# Patient Record
Sex: FEMALE | HISPANIC OR LATINO | Employment: UNEMPLOYED | URBAN - METROPOLITAN AREA
[De-identification: names, ages, dates, MRNs, and addresses within clinical notes are randomized per-mention and may not be internally consistent; named-entity substitution may affect disease eponyms.]

---

## 2018-11-20 NOTE — PROCEDURES
Procedures      Bipolar Radiofrequency Ablation of Sacroiliac Joint Nerves.  Surgeon: Gail Hernandez M.D.,  ASSISTANT:  None    PREOPERATIVE DIAGNOSIS:  Lumbosacral spondylosis with Sacroiliitis.    POSTOPERATIVE DIAGNOSIS:  Lumbosacral spondylosis with Sacroiliitis.    PROCEDURE PERFORMED:  1. Bipolar Radiofrequency ablation of the L5 dorsal rami branch nerve on the right side.  2. Additional levels, lateral branches of the S1, S2 and S3 on the right side.  3. Fluoroscopy for precise needle placement.    ANESTHESIA:  Local infiltration with monitored anesthesia care conscious sedation.    MONITORS:   Automatic blood pressure cuff and pulse oximetry.    INDICATIONS:  2 consecutive successful blocks with 80% improvement of pain after the blocks.  REVIEW OF SYSTEMS:  Negative for fever, chills, chest pain, SOB, bleeding abnormalities, nausea, vomiting, diarrhea, worsening edema, or new rashes.    FOCUSED PHYSICAL EXAMINATION:  The patient is awake, alert and oriented, and is in no acute distress.  Vital signs are stable.  The patient is afebrile.  The rest of the PE is essentially unchanged from the patient's recent visit to our office.    We explained the procedure to the patient including the risks, benefits and alternatives to the procedure.  The patient verbalized understanding and was willing to proceed.    PROCEDURE IN DETAIL:  An informed consent was obtained.  The patient was taken to the procedure room and was positively identified by the staff and the attending physician.  The patient was positioned prone on the procedure bed.  Vital signs were monitored as above and remained stable throughout the procedure.  The skin was prepped and draped in the standard sterile fashion.  A surgical pause (time-out) was performed and was agreed upon by the members of the team. A fluoroscopic view of the lumbar spine was obtained, and the area of interest was identified.  The skin was anesthetized using 1% lidocaine and  25-gauge 1-1/2-inch needle.    After that, two 16-gauge 10-cm cooled RF cannulas with 10-mm active tip was advanced on to juncture of S1 SAP and Sacral ala on the right. Full contact with the bone was established.  Additional RF cannulae were placed 2 needes at each level lateral to right S1, S2, S3 foramen. (Each level was stimulated for sensory fibers at 50 Hz, and the patient's pain was reproduced at approximately 1.2 V.)  (The motor fiber recruitment was ruled out by stimulation at 2 Hz in the range between 1.5 to 2.5 V.)  Prior to lesioning, each nerve was anesthetized with 0.5 mL of Lidocane 1%. For L5 dorsal rami one lesion was performed and after that at each level, we performed three lesions at 90 degrees centigrade, 105 seconds in duration at 5:30, 4:00 and 2:30 o'clock.   Before removing the radiofrequency cannulae, each site was injected with 1 ml of the mixture containing 40 mg Kenalog and 4 cc of bupivacaine 0.25%.    All needles were withdrawn.  The patient tolerated the procedure well.  The patient was transferred in stable condition to the recovery room.    COMPLICATIONS:  None.    PAIN SCORE BEFORE THE PROCEDURE:     PAIN SCORE AFTER THE PROCEDURE:      DISPOSITON:  1. Discharge to home when the discharge criteria are met.  2. Follow up in two weeks in the Pain Clinic.  3. (The patient will resume their medications.

## 2018-11-21 ENCOUNTER — HOSPITAL ENCOUNTER (OUTPATIENT)
Dept: RADIOLOGY | Facility: REHABILITATION | Age: 63
End: 2018-11-21
Attending: PAIN MEDICINE

## 2018-11-21 ENCOUNTER — HOSPITAL ENCOUNTER (OUTPATIENT)
Dept: PAIN MANAGEMENT | Facility: REHABILITATION | Age: 63
End: 2018-11-21
Attending: PAIN MEDICINE
Payer: MEDICARE

## 2018-11-21 VITALS
HEART RATE: 88 BPM | TEMPERATURE: 97.2 F | BODY MASS INDEX: 50.47 KG/M2 | HEIGHT: 60 IN | WEIGHT: 257.06 LBS | DIASTOLIC BLOOD PRESSURE: 68 MMHG | OXYGEN SATURATION: 95 % | RESPIRATION RATE: 16 BRPM | SYSTOLIC BLOOD PRESSURE: 128 MMHG

## 2018-11-21 PROCEDURE — 99152 MOD SED SAME PHYS/QHP 5/>YRS: CPT

## 2018-11-21 PROCEDURE — 64640 INJECTION TREATMENT OF NERVE: CPT

## 2018-11-21 PROCEDURE — 64635 DESTROY LUMB/SAC FACET JNT: CPT

## 2018-11-21 PROCEDURE — 700111 HCHG RX REV CODE 636 W/ 250 OVERRIDE (IP)

## 2018-11-21 RX ORDER — TRIAMCINOLONE ACETONIDE 40 MG/ML
INJECTION, SUSPENSION INTRA-ARTICULAR; INTRAMUSCULAR
Status: COMPLETED
Start: 2018-11-21 | End: 2018-11-21

## 2018-11-21 RX ORDER — LORAZEPAM 0.5 MG/1
TABLET ORAL
Refills: 3 | COMMUNITY
Start: 2018-11-09 | End: 2019-10-16

## 2018-11-21 RX ORDER — ERGOCALCIFEROL 1.25 MG/1
CAPSULE ORAL
Refills: 1 | COMMUNITY
Start: 2018-10-02

## 2018-11-21 RX ORDER — LIOTHYRONINE SODIUM 5 UG/1
10 TABLET ORAL DAILY
COMMUNITY
Start: 2018-11-13

## 2018-11-21 RX ORDER — CLINDAMYCIN HYDROCHLORIDE 300 MG/1
CAPSULE ORAL
Refills: 0 | COMMUNITY
Start: 2018-08-31 | End: 2019-09-18

## 2018-11-21 RX ORDER — ALLOPURINOL 100 MG/1
100 TABLET ORAL
Refills: 11 | COMMUNITY
Start: 2018-11-04

## 2018-11-21 RX ORDER — GABAPENTIN 600 MG/1
TABLET ORAL 2 TIMES DAILY
COMMUNITY
Start: 2018-11-12 | End: 2019-10-16

## 2018-11-21 RX ORDER — LIDOCAINE HYDROCHLORIDE 10 MG/ML
INJECTION, SOLUTION EPIDURAL; INFILTRATION; INTRACAUDAL; PERINEURAL
Status: COMPLETED
Start: 2018-11-21 | End: 2018-11-21

## 2018-11-21 RX ORDER — CARVEDILOL 12.5 MG/1
12.5 TABLET ORAL
Refills: 5 | COMMUNITY
Start: 2018-11-01 | End: 2019-10-16

## 2018-11-21 RX ORDER — MORPHINE SULFATE 15 MG/1
TABLET ORAL
Refills: 0 | COMMUNITY
Start: 2018-11-09 | End: 2020-06-22

## 2018-11-21 RX ORDER — BUPIVACAINE HYDROCHLORIDE 2.5 MG/ML
INJECTION, SOLUTION EPIDURAL; INFILTRATION; INTRACAUDAL
Status: COMPLETED
Start: 2018-11-21 | End: 2018-11-21

## 2018-11-21 RX ORDER — IRBESARTAN 300 MG/1
TABLET ORAL
Refills: 1 | COMMUNITY
Start: 2018-10-20 | End: 2019-10-16

## 2018-11-21 RX ORDER — CYPROHEPTADINE HYDROCHLORIDE 4 MG/1
TABLET ORAL
Refills: 11 | COMMUNITY
Start: 2018-11-01

## 2018-11-21 RX ORDER — NALOXONE HYDROCHLORIDE 4 MG/.1ML
SPRAY NASAL
Refills: 0 | COMMUNITY
Start: 2018-10-10 | End: 2019-10-16

## 2018-11-21 RX ORDER — METOLAZONE 5 MG/1
5 TABLET ORAL
Refills: 11 | COMMUNITY
Start: 2018-09-06 | End: 2019-10-16

## 2018-11-21 RX ORDER — DONEPEZIL HYDROCHLORIDE 10 MG/1
10 TABLET, FILM COATED ORAL EVERY EVENING
Refills: 4 | COMMUNITY
Start: 2018-11-08

## 2018-11-21 RX ORDER — DULOXETIN HYDROCHLORIDE 30 MG/1
30 CAPSULE, DELAYED RELEASE ORAL
Refills: 4 | COMMUNITY
Start: 2018-11-08 | End: 2020-06-22

## 2018-11-21 RX ORDER — CYCLOSPORINE 0.5 MG/ML
EMULSION OPHTHALMIC
COMMUNITY
Start: 2018-11-12 | End: 2020-06-22

## 2018-11-21 RX ORDER — MIDAZOLAM HYDROCHLORIDE 1 MG/ML
INJECTION INTRAMUSCULAR; INTRAVENOUS
Status: COMPLETED
Start: 2018-11-21 | End: 2018-11-21

## 2018-11-21 RX ORDER — DIAPER,BRIEF,ADULT, DISPOSABLE
EACH MISCELLANEOUS
Refills: 12 | COMMUNITY
Start: 2018-09-11

## 2018-11-21 RX ORDER — DIAZEPAM 2 MG/1
2 TABLET ORAL EVERY 6 HOURS PRN
COMMUNITY
End: 2018-11-21 | Stop reason: CLARIF

## 2018-11-21 RX ADMIN — TRIAMCINOLONE ACETONIDE 40 MG: 40 INJECTION, SUSPENSION INTRA-ARTICULAR; INTRAMUSCULAR at 16:10

## 2018-11-21 RX ADMIN — LIDOCAINE HYDROCHLORIDE 30 ML: 10 INJECTION, SOLUTION EPIDURAL; INFILTRATION; INTRACAUDAL; PERINEURAL at 15:56

## 2018-11-21 RX ADMIN — MIDAZOLAM HYDROCHLORIDE 2 MG: 1 INJECTION, SOLUTION INTRAMUSCULAR; INTRAVENOUS at 15:54

## 2018-11-21 RX ADMIN — FENTANYL CITRATE 50 MCG: 50 INJECTION, SOLUTION INTRAMUSCULAR; INTRAVENOUS at 16:02

## 2018-11-21 RX ADMIN — FENTANYL CITRATE 50 MCG: 50 INJECTION, SOLUTION INTRAMUSCULAR; INTRAVENOUS at 15:58

## 2018-11-21 RX ADMIN — BUPIVACAINE HYDROCHLORIDE 4 ML: 2.5 INJECTION, SOLUTION EPIDURAL; INFILTRATION; INTRACAUDAL; PERINEURAL at 16:10

## 2018-11-21 NOTE — NON-PROVIDER
"Reviewed Plan of Care with patient and spouse.Spouse present to act as  per patients preference. Patient in wheel chair but able to bear weight. Normally uses walker but using wheel chair for convenience .Confirmed that she does not take any blood thinning medications. Spouse and patient confirm that patient has spinal cord pain stimulator that is currently \"on\". Dr Hernandez aware and will turn off using magnet. Informed patient and spouse to turn stimulator back on once home. Questioning why both sides are not being done; Dr Hernandez in at chairside to explain POC and why only one side today. All (spouse and patient)in agreement . Reviewed home care instructions with patient prior to procedure. All questions and concerns addressed.     "

## 2019-09-18 ENCOUNTER — HOSPITAL ENCOUNTER (OUTPATIENT)
Dept: RADIOLOGY | Facility: REHABILITATION | Age: 64
End: 2019-09-18
Attending: PAIN MEDICINE

## 2019-09-18 ENCOUNTER — HOSPITAL ENCOUNTER (OUTPATIENT)
Dept: PAIN MANAGEMENT | Facility: REHABILITATION | Age: 64
End: 2019-09-18
Attending: PAIN MEDICINE
Payer: MEDICARE

## 2019-09-18 VITALS
TEMPERATURE: 96.8 F | HEART RATE: 98 BPM | RESPIRATION RATE: 13 BRPM | BODY MASS INDEX: 49.53 KG/M2 | DIASTOLIC BLOOD PRESSURE: 74 MMHG | OXYGEN SATURATION: 95 % | SYSTOLIC BLOOD PRESSURE: 115 MMHG | WEIGHT: 262.35 LBS | HEIGHT: 61 IN

## 2019-09-18 PROCEDURE — 62323 NJX INTERLAMINAR LMBR/SAC: CPT

## 2019-09-18 PROCEDURE — 700111 HCHG RX REV CODE 636 W/ 250 OVERRIDE (IP)

## 2019-09-18 PROCEDURE — 700117 HCHG RX CONTRAST REV CODE 255

## 2019-09-18 RX ORDER — OXYCODONE HYDROCHLORIDE 20 MG/1
TABLET ORAL 4 TIMES DAILY
COMMUNITY
End: 2020-06-22

## 2019-09-18 RX ADMIN — IOHEXOL 1 ML: 240 INJECTION, SOLUTION INTRATHECAL; INTRAVASCULAR; INTRAVENOUS; ORAL at 14:58

## 2019-09-18 RX ADMIN — FENTANYL CITRATE 50 MCG: 50 INJECTION, SOLUTION INTRAMUSCULAR; INTRAVENOUS at 15:03

## 2019-09-18 NOTE — NON-PROVIDER
Patient identified, procedure confirmed, medication allergies, pertinent medical history ( sleep apnea, CHF ), significant patient information ( has spinal stimulator, had Morphine 15 mg. At 1400 , Oxycodone at 1200 Noon,Ativan at 070 AM ).  Site marked by Dr. Hernandez  . Positioned patient by CST, RN, X - ray Tech. Both lower legs & feet pillow placed for support. Hands supported on stool under head of the bed.

## 2019-09-18 NOTE — NON-PROVIDER
Current meds. See medication reconciliation form. Reviewed with pt. Pt denies taking ASA,other blood thinners or anti-inflammatories. Pt has a ride post-procedure (, [place] is ). Printed and verbal discharge instructions given to pt who verbalized understanding.Dr. Hernandez made aware pre-procedure that pt took morphine 15 mg at 1400 today, oxycodone 10 mg at 12 noon today and ativan at 0700 today.

## 2019-09-18 NOTE — NON-PROVIDER
Pt tolerated food and fluid post procedure without nausea or vomiting. Transferred to  and into vehicle with 1 assist. Discharged into care of responsible adult.

## 2019-10-16 ENCOUNTER — APPOINTMENT (OUTPATIENT)
Dept: ADMISSIONS | Facility: MEDICAL CENTER | Age: 64
End: 2019-10-16
Attending: PAIN MEDICINE
Payer: MEDICARE

## 2019-10-16 RX ORDER — PANTOPRAZOLE SODIUM 40 MG/1
40 TABLET, DELAYED RELEASE ORAL
COMMUNITY
Start: 2019-01-27

## 2019-10-16 RX ORDER — CARVEDILOL 12.5 MG/1
12.5 TABLET ORAL
COMMUNITY
Start: 2018-12-06 | End: 2019-10-16

## 2019-10-16 RX ORDER — DULOXETIN HYDROCHLORIDE 30 MG/1
30 CAPSULE, DELAYED RELEASE ORAL
COMMUNITY
Start: 2019-01-04 | End: 2019-10-16

## 2019-10-16 RX ORDER — LORAZEPAM 1 MG/1
1 TABLET ORAL 2 TIMES DAILY
COMMUNITY
End: 2020-06-22

## 2019-10-16 RX ORDER — CYPROHEPTADINE HYDROCHLORIDE 4 MG/1
TABLET ORAL
COMMUNITY
Start: 2018-11-01 | End: 2019-10-16

## 2019-10-16 RX ORDER — ERGOCALCIFEROL 1.25 MG/1
CAPSULE ORAL
COMMUNITY
Start: 2018-12-30 | End: 2019-10-16

## 2019-10-16 RX ORDER — OLMESARTAN MEDOXOMIL 20 MG/1
20 TABLET ORAL DAILY
COMMUNITY

## 2019-10-16 RX ORDER — GABAPENTIN 600 MG/1
600 TABLET ORAL 2 TIMES DAILY
COMMUNITY
Start: 2018-11-12 | End: 2020-06-22

## 2019-10-16 RX ORDER — CLONIDINE HYDROCHLORIDE 0.1 MG/1
0.1 TABLET ORAL 2 TIMES DAILY PRN
COMMUNITY

## 2019-10-16 RX ORDER — CARVEDILOL 6.25 MG/1
6.25 TABLET ORAL 2 TIMES DAILY WITH MEALS
COMMUNITY
End: 2020-06-22

## 2019-10-16 RX ORDER — CYCLOSPORINE 0.5 MG/ML
1 EMULSION OPHTHALMIC 2 TIMES DAILY PRN
COMMUNITY
Start: 2018-11-12

## 2019-10-16 RX ORDER — PNV NO.95/FERROUS FUM/FOLIC AC 28MG-0.8MG
1 TABLET ORAL 2 TIMES DAILY
COMMUNITY

## 2019-10-16 RX ORDER — MORPHINE SULFATE 15 MG/1
TABLET ORAL
COMMUNITY
Start: 2019-01-07 | End: 2019-10-16

## 2019-10-16 RX ORDER — DIAPER,BRIEF,ADULT, DISPOSABLE
EACH MISCELLANEOUS
COMMUNITY
Start: 2018-09-11 | End: 2019-10-16

## 2019-10-16 RX ORDER — ALLOPURINOL 100 MG/1
100 TABLET ORAL
COMMUNITY
Start: 2019-01-19 | End: 2019-10-16

## 2019-10-16 RX ORDER — DONEPEZIL HYDROCHLORIDE 10 MG/1
10 TABLET, FILM COATED ORAL
COMMUNITY
Start: 2018-11-08 | End: 2019-10-16

## 2019-10-16 RX ORDER — METHOCARBAMOL 500 MG/1
500 TABLET, FILM COATED ORAL 3 TIMES DAILY
Refills: 2 | COMMUNITY
Start: 2019-08-26

## 2019-10-16 RX ORDER — BUMETANIDE 1 MG/1
1 TABLET ORAL 2 TIMES DAILY
COMMUNITY
Start: 2019-01-19

## 2019-10-16 RX ORDER — OXYCODONE HYDROCHLORIDE 20 MG/1
TABLET ORAL
COMMUNITY
Start: 2019-01-20 | End: 2019-10-16

## 2019-10-16 RX ORDER — NALOXONE HYDROCHLORIDE 4 MG/.1ML
1 SPRAY NASAL PRN
COMMUNITY
Start: 2018-10-10

## 2019-10-16 RX ORDER — SIMETHICONE 80 MG
TABLET,CHEWABLE ORAL
COMMUNITY
Start: 2019-01-15 | End: 2020-06-22

## 2019-10-16 RX ORDER — LORAZEPAM 0.5 MG/1
TABLET ORAL
COMMUNITY
Start: 2018-11-09 | End: 2019-10-16

## 2019-10-16 RX ORDER — BUMETANIDE 1 MG/1
1 TABLET ORAL 2 TIMES DAILY
COMMUNITY
End: 2019-10-16

## 2019-10-16 RX ORDER — METOLAZONE 5 MG/1
5 TABLET ORAL
COMMUNITY
Start: 2018-09-06 | End: 2019-10-16

## 2019-10-16 RX ORDER — TRAMADOL HYDROCHLORIDE 50 MG/1
TABLET ORAL
Refills: 4 | COMMUNITY
Start: 2019-08-31 | End: 2019-10-16

## 2019-10-16 RX ORDER — LIOTHYRONINE SODIUM 5 UG/1
TABLET ORAL
COMMUNITY
Start: 2018-11-13 | End: 2019-10-16

## 2019-10-16 SDOH — HEALTH STABILITY: MENTAL HEALTH: HOW OFTEN DO YOU HAVE A DRINK CONTAINING ALCOHOL?: NEVER

## 2019-10-16 NOTE — OR NURSING
"Preadmit appointment: \" Preparing for your Procedure information\" sheet given to patient with verbal and written instructions. Patient instructed to continue prescribed medications through the day before surgery, instructed to take the following medications the day of surgery per anesthesia protocol: Cardedilol, Gabapentin, Liothyronine, Lorazepam, Naloxone if needed as prescribed, Oxycodone, Pantoprazole. Pt and  denies patient having anesthesia issues. Pt and  instructed to bring Bipap day of surgery. Dr Julian notified of admit, health summary and BMP. Requested last progress note from Dr. Thomas (pt's PCP) and from [facility] and also requested if she had an ekg within last 6mos to fax copy too    The fact that we do not know what the “heart valve disease” is about is rather worrisome. Any note to obviously help clarify this particular issue would be helpful.  Otherwise not much else to add with one day’s notice. Thank you.    Yuliana Julian M.D.  [facility]  "

## 2019-10-17 ENCOUNTER — ANESTHESIA (OUTPATIENT)
Dept: SURGERY | Facility: MEDICAL CENTER | Age: 64
End: 2019-10-17
Payer: MEDICARE

## 2019-10-17 ENCOUNTER — HOSPITAL ENCOUNTER (OUTPATIENT)
Facility: MEDICAL CENTER | Age: 64
End: 2019-10-18
Attending: PAIN MEDICINE | Admitting: PAIN MEDICINE
Payer: MEDICARE

## 2019-10-17 ENCOUNTER — APPOINTMENT (OUTPATIENT)
Dept: RADIOLOGY | Facility: MEDICAL CENTER | Age: 64
End: 2019-10-17
Attending: PAIN MEDICINE
Payer: MEDICARE

## 2019-10-17 ENCOUNTER — ANESTHESIA EVENT (OUTPATIENT)
Dept: SURGERY | Facility: MEDICAL CENTER | Age: 64
End: 2019-10-17
Payer: MEDICARE

## 2019-10-17 PROBLEM — I10 HTN (HYPERTENSION): Status: ACTIVE | Noted: 2019-10-17

## 2019-10-17 PROBLEM — E66.01 MORBID OBESITY (HCC): Status: ACTIVE | Noted: 2019-10-17

## 2019-10-17 PROBLEM — N18.30 CHRONIC RENAL IMPAIRMENT, STAGE 3 (MODERATE): Status: ACTIVE | Noted: 2019-10-17

## 2019-10-17 PROBLEM — G47.33 OSA TREATED WITH BIPAP: Status: ACTIVE | Noted: 2019-10-17

## 2019-10-17 LAB
EKG IMPRESSION: NORMAL
GLUCOSE BLD-MCNC: 86 MG/DL (ref 65–99)

## 2019-10-17 PROCEDURE — 700105 HCHG RX REV CODE 258: Performed by: PAIN MEDICINE

## 2019-10-17 PROCEDURE — 502000 HCHG MISC OR IMPLANTS RC 0278: Performed by: PAIN MEDICINE

## 2019-10-17 PROCEDURE — 82962 GLUCOSE BLOOD TEST: CPT

## 2019-10-17 PROCEDURE — 700111 HCHG RX REV CODE 636 W/ 250 OVERRIDE (IP): Performed by: ANESTHESIOLOGY

## 2019-10-17 PROCEDURE — A6402 STERILE GAUZE <= 16 SQ IN: HCPCS | Performed by: PAIN MEDICINE

## 2019-10-17 PROCEDURE — 501445 HCHG STAPLER, SKIN DISP: Performed by: PAIN MEDICINE

## 2019-10-17 PROCEDURE — 96376 TX/PRO/DX INJ SAME DRUG ADON: CPT

## 2019-10-17 PROCEDURE — 160035 HCHG PACU - 1ST 60 MINS PHASE I: Performed by: PAIN MEDICINE

## 2019-10-17 PROCEDURE — C1772 INFUSION PUMP, PROGRAMMABLE: HCPCS | Performed by: PAIN MEDICINE

## 2019-10-17 PROCEDURE — C1755 CATHETER, INTRASPINAL: HCPCS | Performed by: PAIN MEDICINE

## 2019-10-17 PROCEDURE — 160036 HCHG PACU - EA ADDL 30 MINS PHASE I: Performed by: PAIN MEDICINE

## 2019-10-17 PROCEDURE — 160009 HCHG ANES TIME/MIN: Performed by: PAIN MEDICINE

## 2019-10-17 PROCEDURE — A6404 STERILE GAUZE > 48 SQ IN: HCPCS | Performed by: PAIN MEDICINE

## 2019-10-17 PROCEDURE — 500448 HCHG DRESSING, TELFA 3X4: Performed by: PAIN MEDICINE

## 2019-10-17 PROCEDURE — 93005 ELECTROCARDIOGRAM TRACING: CPT | Performed by: PAIN MEDICINE

## 2019-10-17 PROCEDURE — 700101 HCHG RX REV CODE 250: Performed by: ANESTHESIOLOGY

## 2019-10-17 PROCEDURE — 72080 X-RAY EXAM THORACOLMB 2/> VW: CPT

## 2019-10-17 PROCEDURE — 700102 HCHG RX REV CODE 250 W/ 637 OVERRIDE(OP): Performed by: ANESTHESIOLOGY

## 2019-10-17 PROCEDURE — 501838 HCHG SUTURE GENERAL: Performed by: PAIN MEDICINE

## 2019-10-17 PROCEDURE — G0378 HOSPITAL OBSERVATION PER HR: HCPCS

## 2019-10-17 PROCEDURE — 160039 HCHG SURGERY MINUTES - EA ADDL 1 MIN LEVEL 3: Performed by: PAIN MEDICINE

## 2019-10-17 PROCEDURE — A9270 NON-COVERED ITEM OR SERVICE: HCPCS | Performed by: PAIN MEDICINE

## 2019-10-17 PROCEDURE — 160028 HCHG SURGERY MINUTES - 1ST 30 MINS LEVEL 3: Performed by: PAIN MEDICINE

## 2019-10-17 PROCEDURE — 700101 HCHG RX REV CODE 250: Performed by: PAIN MEDICINE

## 2019-10-17 PROCEDURE — A9270 NON-COVERED ITEM OR SERVICE: HCPCS | Performed by: ANESTHESIOLOGY

## 2019-10-17 PROCEDURE — C1787 PATIENT PROGR, NEUROSTIM: HCPCS | Performed by: PAIN MEDICINE

## 2019-10-17 PROCEDURE — 96375 TX/PRO/DX INJ NEW DRUG ADDON: CPT

## 2019-10-17 PROCEDURE — 93010 ELECTROCARDIOGRAM REPORT: CPT | Performed by: INTERNAL MEDICINE

## 2019-10-17 PROCEDURE — 700111 HCHG RX REV CODE 636 W/ 250 OVERRIDE (IP): Performed by: PAIN MEDICINE

## 2019-10-17 PROCEDURE — 160002 HCHG RECOVERY MINUTES (STAT): Performed by: PAIN MEDICINE

## 2019-10-17 PROCEDURE — 96374 THER/PROPH/DIAG INJ IV PUSH: CPT

## 2019-10-17 PROCEDURE — 160048 HCHG OR STATISTICAL LEVEL 1-5: Performed by: PAIN MEDICINE

## 2019-10-17 PROCEDURE — 502240 HCHG MISC OR SUPPLY RC 0272: Performed by: PAIN MEDICINE

## 2019-10-17 PROCEDURE — 700102 HCHG RX REV CODE 250 W/ 637 OVERRIDE(OP): Performed by: PAIN MEDICINE

## 2019-10-17 RX ORDER — LABETALOL HYDROCHLORIDE 5 MG/ML
5 INJECTION, SOLUTION INTRAVENOUS
Status: DISCONTINUED | OUTPATIENT
Start: 2019-10-17 | End: 2019-10-17 | Stop reason: HOSPADM

## 2019-10-17 RX ORDER — BACITRACIN 50000 [IU]/1
INJECTION, POWDER, FOR SOLUTION INTRAMUSCULAR
Status: DISCONTINUED | OUTPATIENT
Start: 2019-10-17 | End: 2019-10-17 | Stop reason: HOSPADM

## 2019-10-17 RX ORDER — METOPROLOL TARTRATE 1 MG/ML
1 INJECTION, SOLUTION INTRAVENOUS
Status: DISCONTINUED | OUTPATIENT
Start: 2019-10-17 | End: 2019-10-17 | Stop reason: HOSPADM

## 2019-10-17 RX ORDER — HYDROMORPHONE HYDROCHLORIDE 1 MG/ML
0.1 INJECTION, SOLUTION INTRAMUSCULAR; INTRAVENOUS; SUBCUTANEOUS
Status: DISCONTINUED | OUTPATIENT
Start: 2019-10-17 | End: 2019-10-17 | Stop reason: HOSPADM

## 2019-10-17 RX ORDER — HALOPERIDOL 5 MG/ML
1 INJECTION INTRAMUSCULAR
Status: DISCONTINUED | OUTPATIENT
Start: 2019-10-17 | End: 2019-10-17 | Stop reason: HOSPADM

## 2019-10-17 RX ORDER — VANCOMYCIN HYDROCHLORIDE 1 G/20ML
INJECTION, POWDER, LYOPHILIZED, FOR SOLUTION INTRAVENOUS
Status: COMPLETED | OUTPATIENT
Start: 2019-10-17 | End: 2019-10-17

## 2019-10-17 RX ORDER — ONDANSETRON 2 MG/ML
4 INJECTION INTRAMUSCULAR; INTRAVENOUS EVERY 4 HOURS PRN
Status: DISCONTINUED | OUTPATIENT
Start: 2019-10-17 | End: 2019-10-18 | Stop reason: HOSPADM

## 2019-10-17 RX ORDER — HYDROMORPHONE HYDROCHLORIDE 1 MG/ML
0.5 INJECTION, SOLUTION INTRAMUSCULAR; INTRAVENOUS; SUBCUTANEOUS
Status: DISCONTINUED | OUTPATIENT
Start: 2019-10-17 | End: 2019-10-18 | Stop reason: HOSPADM

## 2019-10-17 RX ORDER — ONDANSETRON 2 MG/ML
INJECTION INTRAMUSCULAR; INTRAVENOUS PRN
Status: DISCONTINUED | OUTPATIENT
Start: 2019-10-17 | End: 2019-10-17 | Stop reason: SURG

## 2019-10-17 RX ORDER — DEXAMETHASONE SODIUM PHOSPHATE 4 MG/ML
INJECTION, SOLUTION INTRA-ARTICULAR; INTRALESIONAL; INTRAMUSCULAR; INTRAVENOUS; SOFT TISSUE PRN
Status: DISCONTINUED | OUTPATIENT
Start: 2019-10-17 | End: 2019-10-17 | Stop reason: SURG

## 2019-10-17 RX ORDER — HYDROMORPHONE HYDROCHLORIDE 1 MG/ML
0.4 INJECTION, SOLUTION INTRAMUSCULAR; INTRAVENOUS; SUBCUTANEOUS
Status: DISCONTINUED | OUTPATIENT
Start: 2019-10-17 | End: 2019-10-17 | Stop reason: HOSPADM

## 2019-10-17 RX ORDER — CELECOXIB 200 MG/1
200 CAPSULE ORAL 2 TIMES DAILY
Status: DISCONTINUED | OUTPATIENT
Start: 2019-10-18 | End: 2019-10-18 | Stop reason: HOSPADM

## 2019-10-17 RX ORDER — ACETAMINOPHEN 500 MG
1000 TABLET ORAL ONCE
Status: COMPLETED | OUTPATIENT
Start: 2019-10-17 | End: 2019-10-17

## 2019-10-17 RX ORDER — HYDROMORPHONE HYDROCHLORIDE 1 MG/ML
0.25 INJECTION, SOLUTION INTRAMUSCULAR; INTRAVENOUS; SUBCUTANEOUS
Status: DISCONTINUED | OUTPATIENT
Start: 2019-10-17 | End: 2019-10-18 | Stop reason: HOSPADM

## 2019-10-17 RX ORDER — HYDROMORPHONE HYDROCHLORIDE 1 MG/ML
0.2 INJECTION, SOLUTION INTRAMUSCULAR; INTRAVENOUS; SUBCUTANEOUS
Status: DISCONTINUED | OUTPATIENT
Start: 2019-10-17 | End: 2019-10-17 | Stop reason: HOSPADM

## 2019-10-17 RX ORDER — FAMOTIDINE 20 MG/1
20 TABLET, FILM COATED ORAL DAILY
Status: DISCONTINUED | OUTPATIENT
Start: 2019-10-17 | End: 2019-10-18 | Stop reason: HOSPADM

## 2019-10-17 RX ORDER — MEPERIDINE HYDROCHLORIDE 50 MG/ML
12.5 INJECTION INTRAMUSCULAR; INTRAVENOUS; SUBCUTANEOUS
Status: DISCONTINUED | OUTPATIENT
Start: 2019-10-17 | End: 2019-10-17 | Stop reason: HOSPADM

## 2019-10-17 RX ORDER — CEFAZOLIN SODIUM 1 G/3ML
INJECTION, POWDER, FOR SOLUTION INTRAMUSCULAR; INTRAVENOUS PRN
Status: DISCONTINUED | OUTPATIENT
Start: 2019-10-17 | End: 2019-10-17 | Stop reason: SURG

## 2019-10-17 RX ORDER — SODIUM CHLORIDE, SODIUM LACTATE, POTASSIUM CHLORIDE, CALCIUM CHLORIDE 600; 310; 30; 20 MG/100ML; MG/100ML; MG/100ML; MG/100ML
INJECTION, SOLUTION INTRAVENOUS CONTINUOUS
Status: ACTIVE | OUTPATIENT
Start: 2019-10-17 | End: 2019-10-17

## 2019-10-17 RX ORDER — OXYCODONE HCL 5 MG/5 ML
5 SOLUTION, ORAL ORAL
Status: COMPLETED | OUTPATIENT
Start: 2019-10-17 | End: 2019-10-17

## 2019-10-17 RX ORDER — ONDANSETRON 2 MG/ML
4 INJECTION INTRAMUSCULAR; INTRAVENOUS
Status: DISCONTINUED | OUTPATIENT
Start: 2019-10-17 | End: 2019-10-17 | Stop reason: HOSPADM

## 2019-10-17 RX ORDER — OXYCODONE HYDROCHLORIDE 10 MG/1
10 TABLET ORAL
Status: DISCONTINUED | OUTPATIENT
Start: 2019-10-17 | End: 2019-10-18 | Stop reason: HOSPADM

## 2019-10-17 RX ORDER — GABAPENTIN 300 MG/1
300 CAPSULE ORAL ONCE
Status: DISCONTINUED | OUTPATIENT
Start: 2019-10-17 | End: 2019-10-17 | Stop reason: HOSPADM

## 2019-10-17 RX ORDER — MIDAZOLAM HYDROCHLORIDE 1 MG/ML
INJECTION INTRAMUSCULAR; INTRAVENOUS PRN
Status: DISCONTINUED | OUTPATIENT
Start: 2019-10-17 | End: 2019-10-17 | Stop reason: SURG

## 2019-10-17 RX ORDER — ROCURONIUM BROMIDE 10 MG/ML
INJECTION, SOLUTION INTRAVENOUS PRN
Status: DISCONTINUED | OUTPATIENT
Start: 2019-10-17 | End: 2019-10-17 | Stop reason: SURG

## 2019-10-17 RX ORDER — OXYCODONE HYDROCHLORIDE 5 MG/1
2.5 TABLET ORAL
Status: DISCONTINUED | OUTPATIENT
Start: 2019-10-17 | End: 2019-10-18 | Stop reason: HOSPADM

## 2019-10-17 RX ORDER — MIDAZOLAM HYDROCHLORIDE 1 MG/ML
1 INJECTION INTRAMUSCULAR; INTRAVENOUS
Status: DISCONTINUED | OUTPATIENT
Start: 2019-10-17 | End: 2019-10-17 | Stop reason: HOSPADM

## 2019-10-17 RX ORDER — HYDRALAZINE HYDROCHLORIDE 20 MG/ML
5 INJECTION INTRAMUSCULAR; INTRAVENOUS
Status: DISCONTINUED | OUTPATIENT
Start: 2019-10-17 | End: 2019-10-17 | Stop reason: HOSPADM

## 2019-10-17 RX ORDER — SODIUM CHLORIDE, SODIUM LACTATE, POTASSIUM CHLORIDE, CALCIUM CHLORIDE 600; 310; 30; 20 MG/100ML; MG/100ML; MG/100ML; MG/100ML
INJECTION, SOLUTION INTRAVENOUS CONTINUOUS
Status: DISCONTINUED | OUTPATIENT
Start: 2019-10-17 | End: 2019-10-17 | Stop reason: HOSPADM

## 2019-10-17 RX ORDER — DIPHENHYDRAMINE HYDROCHLORIDE 50 MG/ML
12.5 INJECTION INTRAMUSCULAR; INTRAVENOUS
Status: DISCONTINUED | OUTPATIENT
Start: 2019-10-17 | End: 2019-10-17 | Stop reason: HOSPADM

## 2019-10-17 RX ORDER — OXYCODONE HCL 5 MG/5 ML
10 SOLUTION, ORAL ORAL
Status: COMPLETED | OUTPATIENT
Start: 2019-10-17 | End: 2019-10-17

## 2019-10-17 RX ORDER — KETOROLAC TROMETHAMINE 30 MG/ML
15 INJECTION, SOLUTION INTRAMUSCULAR; INTRAVENOUS EVERY 6 HOURS
Status: COMPLETED | OUTPATIENT
Start: 2019-10-17 | End: 2019-10-18

## 2019-10-17 RX ORDER — HYDROMORPHONE HYDROCHLORIDE 2 MG/ML
INJECTION, SOLUTION INTRAMUSCULAR; INTRAVENOUS; SUBCUTANEOUS PRN
Status: DISCONTINUED | OUTPATIENT
Start: 2019-10-17 | End: 2019-10-17 | Stop reason: SURG

## 2019-10-17 RX ORDER — BACITRACIN ZINC 500 [USP'U]/G
OINTMENT TOPICAL
Status: DISCONTINUED | OUTPATIENT
Start: 2019-10-17 | End: 2019-10-17 | Stop reason: HOSPADM

## 2019-10-17 RX ORDER — LIDOCAINE HYDROCHLORIDE 20 MG/ML
INJECTION, SOLUTION EPIDURAL; INFILTRATION; INTRACAUDAL; PERINEURAL PRN
Status: DISCONTINUED | OUTPATIENT
Start: 2019-10-17 | End: 2019-10-17 | Stop reason: SURG

## 2019-10-17 RX ADMIN — HYDROMORPHONE HYDROCHLORIDE 1 MG: 2 INJECTION, SOLUTION INTRAMUSCULAR; INTRAVENOUS; SUBCUTANEOUS at 09:49

## 2019-10-17 RX ADMIN — KETOROLAC TROMETHAMINE 15 MG: 30 INJECTION, SOLUTION INTRAMUSCULAR; INTRAVENOUS at 23:21

## 2019-10-17 RX ADMIN — DEXAMETHASONE SODIUM PHOSPHATE 4 MG: 4 INJECTION, SOLUTION INTRAMUSCULAR; INTRAVENOUS at 08:16

## 2019-10-17 RX ADMIN — SODIUM CHLORIDE, POTASSIUM CHLORIDE, SODIUM LACTATE AND CALCIUM CHLORIDE: 600; 310; 30; 20 INJECTION, SOLUTION INTRAVENOUS at 07:54

## 2019-10-17 RX ADMIN — SUGAMMADEX 200 MG: 100 INJECTION, SOLUTION INTRAVENOUS at 09:48

## 2019-10-17 RX ADMIN — EPHEDRINE SULFATE 10 MG: 50 INJECTION INTRAMUSCULAR; INTRAVENOUS; SUBCUTANEOUS at 08:35

## 2019-10-17 RX ADMIN — OXYCODONE HYDROCHLORIDE 10 MG: 5 SOLUTION ORAL at 10:06

## 2019-10-17 RX ADMIN — KETOROLAC TROMETHAMINE 15 MG: 30 INJECTION, SOLUTION INTRAMUSCULAR; INTRAVENOUS at 18:04

## 2019-10-17 RX ADMIN — ACETAMINOPHEN 1000 MG: 500 TABLET ORAL at 07:53

## 2019-10-17 RX ADMIN — CEFAZOLIN 3 G: 1 INJECTION, POWDER, FOR SOLUTION INTRAVENOUS at 08:28

## 2019-10-17 RX ADMIN — FENTANYL CITRATE 50 MCG: 50 INJECTION, SOLUTION INTRAMUSCULAR; INTRAVENOUS at 09:19

## 2019-10-17 RX ADMIN — FAMOTIDINE 20 MG: 20 TABLET ORAL at 18:04

## 2019-10-17 RX ADMIN — PROPOFOL 150 MG: 10 INJECTION, EMULSION INTRAVENOUS at 08:16

## 2019-10-17 RX ADMIN — BUPIVACAINE HYDROCHLORIDE: 7.5 INJECTION, SOLUTION EPIDURAL; RETROBULBAR at 09:28

## 2019-10-17 RX ADMIN — HYDROMORPHONE HYDROCHLORIDE 0.25 MG: 1 INJECTION, SOLUTION INTRAMUSCULAR; INTRAVENOUS; SUBCUTANEOUS at 23:40

## 2019-10-17 RX ADMIN — KETOROLAC TROMETHAMINE 15 MG: 30 INJECTION, SOLUTION INTRAMUSCULAR; INTRAVENOUS at 11:40

## 2019-10-17 RX ADMIN — EPHEDRINE SULFATE 10 MG: 50 INJECTION INTRAMUSCULAR; INTRAVENOUS; SUBCUTANEOUS at 08:33

## 2019-10-17 RX ADMIN — OXYCODONE HYDROCHLORIDE 10 MG: 10 TABLET ORAL at 13:57

## 2019-10-17 RX ADMIN — HYDROMORPHONE HYDROCHLORIDE 1 MG: 2 INJECTION, SOLUTION INTRAMUSCULAR; INTRAVENOUS; SUBCUTANEOUS at 09:30

## 2019-10-17 RX ADMIN — ROCURONIUM BROMIDE 50 MG: 10 INJECTION, SOLUTION INTRAVENOUS at 08:16

## 2019-10-17 RX ADMIN — HYDROMORPHONE HYDROCHLORIDE 1 MG: 2 INJECTION, SOLUTION INTRAMUSCULAR; INTRAVENOUS; SUBCUTANEOUS at 09:38

## 2019-10-17 RX ADMIN — FENTANYL CITRATE 50 MCG: 0.05 INJECTION, SOLUTION INTRAMUSCULAR; INTRAVENOUS at 10:09

## 2019-10-17 RX ADMIN — HYDROMORPHONE HYDROCHLORIDE 0.5 MG: 1 INJECTION, SOLUTION INTRAMUSCULAR; INTRAVENOUS; SUBCUTANEOUS at 16:13

## 2019-10-17 RX ADMIN — FENTANYL CITRATE 50 MCG: 50 INJECTION, SOLUTION INTRAMUSCULAR; INTRAVENOUS at 09:01

## 2019-10-17 RX ADMIN — FENTANYL CITRATE 50 MCG: 0.05 INJECTION, SOLUTION INTRAMUSCULAR; INTRAVENOUS at 10:30

## 2019-10-17 RX ADMIN — ONDANSETRON 4 MG: 2 INJECTION INTRAMUSCULAR; INTRAVENOUS at 09:33

## 2019-10-17 RX ADMIN — FENTANYL CITRATE 100 MCG: 50 INJECTION, SOLUTION INTRAMUSCULAR; INTRAVENOUS at 08:16

## 2019-10-17 RX ADMIN — HYDROMORPHONE HYDROCHLORIDE 0.5 MG: 1 INJECTION, SOLUTION INTRAMUSCULAR; INTRAVENOUS; SUBCUTANEOUS at 19:50

## 2019-10-17 RX ADMIN — OXYCODONE HYDROCHLORIDE 10 MG: 10 TABLET ORAL at 18:04

## 2019-10-17 RX ADMIN — LIDOCAINE HYDROCHLORIDE 100 MG: 20 INJECTION, SOLUTION EPIDURAL; INFILTRATION; INTRACAUDAL; PERINEURAL at 08:16

## 2019-10-17 RX ADMIN — MIDAZOLAM HYDROCHLORIDE 2 MG: 1 INJECTION, SOLUTION INTRAMUSCULAR; INTRAVENOUS at 08:10

## 2019-10-17 RX ADMIN — SODIUM CHLORIDE, POTASSIUM CHLORIDE, SODIUM LACTATE AND CALCIUM CHLORIDE: 600; 310; 30; 20 INJECTION, SOLUTION INTRAVENOUS at 09:38

## 2019-10-17 RX ADMIN — OXYCODONE HYDROCHLORIDE 10 MG: 10 TABLET ORAL at 21:49

## 2019-10-17 ASSESSMENT — COGNITIVE AND FUNCTIONAL STATUS - GENERAL
MOBILITY SCORE: 20
CLIMB 3 TO 5 STEPS WITH RAILING: A LITTLE
SUGGESTED CMS G CODE MODIFIER MOBILITY: CJ
SUGGESTED CMS G CODE MODIFIER DAILY ACTIVITY: CI
STANDING UP FROM CHAIR USING ARMS: A LITTLE
DAILY ACTIVITIY SCORE: 23
TOILETING: A LITTLE
MOVING TO AND FROM BED TO CHAIR: A LITTLE
WALKING IN HOSPITAL ROOM: A LITTLE

## 2019-10-17 ASSESSMENT — LIFESTYLE VARIABLES
AVERAGE NUMBER OF DAYS PER WEEK YOU HAVE A DRINK CONTAINING ALCOHOL: 0
EVER FELT BAD OR GUILTY ABOUT YOUR DRINKING: NO
ON A TYPICAL DAY WHEN YOU DRINK ALCOHOL HOW MANY DRINKS DO YOU HAVE: 0
TOTAL SCORE: 0
HOW MANY TIMES IN THE PAST YEAR HAVE YOU HAD 5 OR MORE DRINKS IN A DAY: 0
TOTAL SCORE: 0
HAVE YOU EVER FELT YOU SHOULD CUT DOWN ON YOUR DRINKING: NO
ALCOHOL_USE: NO
HAVE PEOPLE ANNOYED YOU BY CRITICIZING YOUR DRINKING: NO
TOTAL SCORE: 0
EVER HAD A DRINK FIRST THING IN THE MORNING TO STEADY YOUR NERVES TO GET RID OF A HANGOVER: NO
CONSUMPTION TOTAL: NEGATIVE

## 2019-10-17 ASSESSMENT — PATIENT HEALTH QUESTIONNAIRE - PHQ9
2. FEELING DOWN, DEPRESSED, IRRITABLE, OR HOPELESS: NOT AT ALL
1. LITTLE INTEREST OR PLEASURE IN DOING THINGS: NOT AT ALL
SUM OF ALL RESPONSES TO PHQ9 QUESTIONS 1 AND 2: 0

## 2019-10-17 ASSESSMENT — PAIN SCALES - GENERAL: PAIN_LEVEL: 5

## 2019-10-17 NOTE — OR NURSING
1000: Care assumed of awake pt c/o generalized severe pain. Pt uses BiPAP at home so same applied. Pt logrolled and lumbar surgical dressings noted to be c/d/i, abdo binder in place over dressings. Plan to keep pt in PACU for full hour per [facility] protocol. HOB elevated  1010: Pain persists, medicated after tolerated po water. S/b Dr Hernandez who is aware of decreased sensation to lower extremities and states it is expected.  1015: SPO2 drops to 80's intermittently, pt states she uses O2 w/BiPAP at home so O2 2 LPM added through BiPAP tubing.  1025 Pain decreasing but not tolerable  1040: pain now tolerable  1055: No change in surgical site assessment.  1100: Meets criteria to transfer to [facility]

## 2019-10-17 NOTE — ANESTHESIA POSTPROCEDURE EVALUATION
Patient: Charis Stinson    Procedure Summary     Date:  10/17/19 Room / Location:  [place] [address] / SURGERY [facility]    Anesthesia Start:  0812 Anesthesia Stop:  1000    Procedure:  PUMP INSERT/REMOVE - PERMANENT DRUG DELIVERY SYSTEM IMPLANT (Back) Diagnosis:  (LUMBAR SPONDYLOSIS, FAILED BACK SURGERY SYNDROME)    Surgeon:  Gail Hernandez M.D. Responsible Provider:  Maico Fletcher M.D.    Anesthesia Type:  general ASA Status:  3          Final Anesthesia Type: general  Last vitals  BP   Blood Pressure : 145/89, NIBP: 140/83    Temp   36.1 °C (97 °F)    Pulse   Heart Rate (Monitored): 93   Resp   14    SpO2   95 %      Anesthesia Post Evaluation    Patient location during evaluation: PACU  Patient participation: complete - patient participated  Level of consciousness: awake and alert  Pain score: 5    Airway patency: patent  Anesthetic complications: no  Cardiovascular status: hemodynamically stable  Respiratory status: acceptable  Hydration status: euvolemic    PONV: none           Nurse Pain Score: 5 (NPRS)

## 2019-10-17 NOTE — ANESTHESIA PROCEDURE NOTES
Airway  Date/Time: 10/17/2019 8:18 AM  Performed by: Maico Fletcher M.D.  Authorized by: Maico Fletcher M.D.     Location:  OR  Urgency:  Elective  Indications for Airway Management:  Anesthesia  Spontaneous Ventilation: absent    Sedation Level:  Deep  Preoxygenated: Yes    Patient Position:  Sniffing  Mask Difficulty Assessment:  1 - vent by mask  Final Airway Type:  Endotracheal airway  Final Endotracheal Airway:  ETT  Cuffed: Yes    Technique Used for Successful ETT Placement:  Direct laryngoscopy  Insertion Site:  Oral  Blade Type:  Hudson  Laryngoscope Blade/Videolaryngoscope Blade Size:  3  ETT Size (mm):  7.0  Measured from:  Teeth  ETT to Teeth (cm):  21  Placement Verified by: auscultation and capnometry    Cormack-Lehane Classification:  Grade I - full view of glottis  Number of Attempts at Approach:  1

## 2019-10-17 NOTE — ANESTHESIA TIME REPORT
Anesthesia Start and Stop Event Times     Date Time Event    10/17/2019 0740 Ready for Procedure     0812 Anesthesia Start     1000 Anesthesia Stop        Responsible Staff  10/17/19    Name Role Begin End    Maico Fletcher M.D. Anesth 0812 1000        Preop Diagnosis (Free Text):  Pre-op Diagnosis     LUMBAR SPONDYLOSIS, FAILED BACK SURGERY SYNDROME        Preop Diagnosis (Codes):    Post op Diagnosis  Failed back surgical syndrome      Premium Reason  Non-Premium    Comments:

## 2019-10-17 NOTE — CARE PLAN
Problem: Communication  Goal: The ability to communicate needs accurately and effectively will improve  Outcome: PROGRESSING AS EXPECTED  Note:   Pt encouraged to use call light for needs.      Problem: Pain Management  Goal: Pain level will decrease to patient's comfort goal  Outcome: PROGRESSING SLOWER THAN EXPECTED  Flowsheets (Taken 10/17/2019 1223)  Pain Rating Scale (NPRS): 6  Note:   Pt states she has 6/10 pain post op. Pt given pain medications per MAR. Will continue to monitor.

## 2019-10-17 NOTE — ANESTHESIA PREPROCEDURE EVALUATION
Relevant Problems   ANESTHESIA   (+) GEOVANNA treated with BiPAP      CARDIAC   (+) HTN (hypertension)         (+) Chronic renal impairment, stage 3 (moderate) (HCC)      Other   (+) Morbid obesity (HCC)       Physical Exam    Airway   Mallampati: III  TM distance: >3 FB  Neck ROM: full       Cardiovascular - normal exam  Rhythm: regular  Rate: normal  (-) murmur     Dental - normal exam  (+) upper dentures         Pulmonary - normal exam  Breath sounds clear to auscultation     Abdominal    Neurological - normal exam               Anesthesia Plan    ASA 3   ASA physical status 3 criteria: morbid obesity - BMI greater than or equal to 40    Plan - general       Airway plan will be ETT        Induction: intravenous    Postoperative Plan: Postoperative administration of opioids is intended.    Pertinent diagnostic labs and testing reviewed    Informed Consent:    Anesthetic plan and risks discussed with patient.    Use of blood products discussed with: patient whom consented to blood products.

## 2019-10-17 NOTE — ANESTHESIA QCDR
2019 [facility] Clinical Data Registry (for Quality Improvement)     Postoperative nausea/vomiting risk protocol (Adult = 18 yrs and Pediatric 3-17 yrs)- (430 and 463)  General inhalation anesthetic (NOT TIVA) with PONV risk factors: Yes  Provision of anti-emetic therapy with at least 2 different classes of agents: Yes   Patient DID NOT receive anti-emetic therapy and reason is documented in Medical Record:  N/A    Multimodal Pain Management- (AQI59)  Patient undergoing Elective Surgery (i.e. Outpatient, or ASC, or Prescheduled Surgery prior to Hospital Admission): Yes  Use of Multimodal Pain Management, two or more drugs and/or interventions, NOT including systemic opioids: Yes   Exception: Documented allergy to multiple classes of analgesics:  N/A    PACU assessment of acute postoperative pain prior to Anesthesia Care End- Applies to Patients Age = 18- (ABG7)  Initial PACU pain score is which of the following: < 7/10  Patient unable to report pain score: N/A    Post-anesthetic transfer of care checklist/protocol to PACU/ICU- (426 and 427)  Upon conclusion of case, patient transferred to which of the following locations: PACU/Non-ICU  Use of transfer checklist/protocol: Yes  Exclusion: Service Performed in Patient Hospital Room (and thus did not require transfer): N/A    PACU Reintubation- (AQI31)  General anesthesia requiring endotracheal intubation (ETT) along with subsequent extubation in OR or PACU: Yes  Required reintubation in the PACU: No   Extubation was a planned trial documented in the medical record prior to removal of the original airway device:  N/A    Unplanned admission to ICU related to anesthesia service up through end of PACU care- (MD51)  Unplanned admission to ICU (not initially anticipated at anesthesia start time): No

## 2019-10-18 VITALS
HEIGHT: 61 IN | OXYGEN SATURATION: 97 % | TEMPERATURE: 98.7 F | SYSTOLIC BLOOD PRESSURE: 117 MMHG | RESPIRATION RATE: 16 BRPM | BODY MASS INDEX: 49.74 KG/M2 | WEIGHT: 263.45 LBS | DIASTOLIC BLOOD PRESSURE: 53 MMHG | HEART RATE: 47 BPM

## 2019-10-18 PROCEDURE — G0378 HOSPITAL OBSERVATION PER HR: HCPCS

## 2019-10-18 PROCEDURE — 96376 TX/PRO/DX INJ SAME DRUG ADON: CPT

## 2019-10-18 PROCEDURE — 700111 HCHG RX REV CODE 636 W/ 250 OVERRIDE (IP): Performed by: PAIN MEDICINE

## 2019-10-18 PROCEDURE — A9270 NON-COVERED ITEM OR SERVICE: HCPCS | Performed by: PAIN MEDICINE

## 2019-10-18 PROCEDURE — 700102 HCHG RX REV CODE 250 W/ 637 OVERRIDE(OP): Performed by: PAIN MEDICINE

## 2019-10-18 RX ADMIN — OXYCODONE HYDROCHLORIDE 10 MG: 10 TABLET ORAL at 10:47

## 2019-10-18 RX ADMIN — KETOROLAC TROMETHAMINE 15 MG: 30 INJECTION, SOLUTION INTRAMUSCULAR; INTRAVENOUS at 05:04

## 2019-10-18 RX ADMIN — OXYCODONE HYDROCHLORIDE 10 MG: 10 TABLET ORAL at 07:15

## 2019-10-18 RX ADMIN — HYDROMORPHONE HYDROCHLORIDE 0.5 MG: 1 INJECTION, SOLUTION INTRAMUSCULAR; INTRAVENOUS; SUBCUTANEOUS at 02:48

## 2019-10-18 RX ADMIN — OXYCODONE HYDROCHLORIDE 10 MG: 10 TABLET ORAL at 01:09

## 2019-10-18 RX ADMIN — HYDROMORPHONE HYDROCHLORIDE 0.5 MG: 1 INJECTION, SOLUTION INTRAMUSCULAR; INTRAVENOUS; SUBCUTANEOUS at 08:41

## 2019-10-18 NOTE — CARE PLAN
Problem: Safety  Goal: Will remain free from falls  Outcome: PROGRESSING AS EXPECTED  Intervention: Implement fall precautions  Flowsheets (Taken 10/18/2019 0145)  Environmental Precautions: Treaded Slipper Socks on Patient; Bed in Low Position; Mobility Assessed & Appropriate Sign Placed     Problem: Venous Thromboembolism (VTW)/Deep Vein Thrombosis (DVT) Prevention:  Goal: Patient will participate in Venous Thrombosis (VTE)/Deep Vein Thrombosis (DVT)Prevention Measures  Outcome: PROGRESSING AS EXPECTED  Flowsheets  Taken 10/17/2019 1200 by Eric Cohn R.N.  Mechanical Prophylaxis : SCDs, Sequential Compression Device  Taken 10/18/2019 0145 by Mao Echavarria R.N.  AV Foot Pumps: On     Problem: Knowledge Deficit  Goal: Knowledge of disease process/condition, treatment plan, diagnostic tests, and medications will improve  Outcome: PROGRESSING AS EXPECTED  Goal: Knowledge of the prescribed therapeutic regimen will improve  Outcome: PROGRESSING AS EXPECTED  Note:   Patient/ family member updated on Plan Of Care and Nurses communications with Doctors.

## 2019-10-18 NOTE — PROGRESS NOTES
Assumed care of patient at 1900.  Patient is alert and oriented, resting in bed with family at bedside.  Abdominal binder remains intact and dressing to back intact with serosanguinous drainage.  Patient has ice pack to face.  Call light in reach and patient encouraged to call for assistance with ambulating.

## 2019-10-18 NOTE — DISCHARGE INSTRUCTIONS
Discharge Instructions        Do not remove dressing for 72 hours  No shower for 72 hours  Follow up 10 days with the clinic  Continue Antibiotic    Discharged to home by car with relative. Discharged via wheelchair, hospital escort: Yes.  Special equipment needed: Not Applicable  Be sure to schedule a follow-up appointment with your primary care doctor or any specialists as instructed.     Discharge Plan:   Influenza Vaccine Indication: Not indicated: Previously immunized this influenza season and > 8 years of age    I understand that a diet low in cholesterol, fat, and sodium is recommended for good health. Unless I have been given specific instructions below for another diet, I accept this instruction as my diet prescription.   Other diet: Regular diet    Special Instructions:    Bomba implantable para el dolor - Cuidados en el [facility]   (Implantable Pain Pump Home Care)  Marquita bomba implantable para el dolor es un dispositivo pequeño, aproximadamente del tamaño de un disco de hockey. Se coloca debajo de la piel (se implanta) elizabeth marquita cirugía. A la bomba se le adjunta un catéter (pequeño tubo plástico). El tubo va dentro de marquita vena o en el espacio que rodea la médula oliver. La bomba tiene un espacio (depósito) donde se almacena el medicamento. El dispositivo bombea desde el depósito al cuerpo a un ritmo regular preestablecido. En algunos casos, el médico podrá programar la bomba para administrarle un analgésico cuando lo necesite. Podría administrarlo en un flujo savana. O rubia, podría administrar marquita medida diferente en momentos distintos. El depósito se puede rellenar fácilmente cuando queda poco contenido.   Marquita bomba implantada administra el medicamento en el momento adecuado para aliviar el dolor. [place] generalmente implica que necesitará menos medicación. También debería jaye og resultado menos efectos secundarios.   CUIDADOS EN EL [facility]   Marquita bomba implantable puede permanecer en el lugar elizabeth  mucho tiempo. Gabriel no se preocupe: Se puede quitar en cualquier momento. Por ejemplo, si floyd afección cambia o si la bomba ya no le da resultado, puede retirarse. Mientras tenga la bomba implantable:   · Tendrá que hacer visitas regulares a floyd médico. Lela estas visitas:  ¨ La bomba se vuelve a llenar con el analgésico. El médico insertará marquita aguja a través de la piel dentro de la bomba. El medicamento fluirá a través de la aguja hacia adentro del depósito. Por lo general es necesario rellenar el depósito cada 4 a 8 semanas. El tiempo de recarga varía de marquita persona a otra. Asegúrese de preguntar con qué frecuencia deberá volver a llenar la bomba.  ¨ El medicamento puede ajustarse. La cantidad se puede cambiar. O rubia, se puede ajustar la frecuencia con que bombea el medicamento a floyd cuerpo. Estas modificaciones dependerán de la eficacia con que el dispositivo reduce el dolor. Sea honesto al describir floyd dolor a floyd médico. Ellos necesitan esta información para asegurarse de obtener la cantidad correcta y el tipo adecuado de analgésico. También dependerá de los efectos secundarios. Explique cualquier síntoma que tenga, incluso si piensa que no tienen nada que олег con la bomba para el dolor. Informe a floyd médico si tiene náuseas, está excesivamente somnoliento o siente picazón. Floyd médico sabrá si el medicamento podría ser la causa de estos u otros efectos secundarios. El objetivo es darle suficiente medicamento para aliviar el dolor, gabriel no tanto og para que sufra efectos secundarios.  ¨ La bomba será controlada para asegurarse de que sigue funcionando correctamente. La batería y la bomba a menudo eddy hasta chio años. La bomba emitirá un pitido si se necesita marquita nueva batería. También sonará si necesita marquita recarga o si hay algún otro problema.  Consulte a floyd médico si puede kayode medicamentos de venta dimple para calmar eddy o bajar la fiebre. Según el tipo de medicamento que reciba por la bomba, podrá  kayode o no otros medicamentos.   Al principio, tendrá que restringir salazar movimientos y actividades.   · Elizabeth 6 a 8 semanas:  ¨ No duerma sobre el abdomen.  ¨ Evite flexiones y estiramientos excesivos.  ¨ No levante los brazos sobre la ravi.  ¨ No levante nada que pese más de 5 libras (2,5 kg).  · No conduzca vehículos elizabeth al menos dos semanas (o hasta que floyd médico lo autorice).  · No tome reva de inmersión elizabeth aproximadamente un mes. Sólo dúchese.  · No emmie trabajos domésticos (de interior ni exterior) hasta que floyd médico lo autorice. Por ejemplo, no recargue el lavavajillas. No pase la aspiradora. No sarah el césped.  Con el tiempo, no tendrá tantas restricciones. Sólo recuerde que podrá regresar a salazar actividades regulares de a poco. Pregunte a floyd médico si la fisioterapia puede ayudarlo. Gabriel aún tendrá que tener cuidado en algunas situaciones:   · Siempre preste atención a la neli de piel que cubre la bomba. Llame a floyd médico si se vuelve eusebio, caliente o se hincha. Llame si la neli le duele o si la incisión comienza a perder efrain o algún líquido. Estos podrían ser signos de infección.  · Obtener marquita tarjeta de identificación especial que indique que usted tiene marquita bomba implantada. Se llama Tarjeta de identificación de dispositivo implantado (Implanted Device Identification Card). Para conseguirla, necesitará marquita carta o formulario firmado por floyd médico. Consulte a floyd médico cómo conseguir esta tarjeta de identificación. Necesitará esta tarjeta cuando viaje. Y es importante tenerla en kiesha de emergencia. Llévela con usted en todo momento.  · Debe alertar a los inspectores de seguridad de los aeropuertos que tiene marquita bomba implantada. La bomba activará las alarmas y los controles de seguridad. Muestre floyd tarjeta de identificación. Pida que lo controlen con un detector de metales.  · No se preocupe si debe estar cerca de los hornos de microondas, teléfonos celulares u otros dispositivos  electrónicos. No dañarán la bomba.  · Asegúrese de que salazar familiares y amigos cercanos sepan que tiene marquita bomba implantada. Es posible que deba decirle a algunos compañeros de trabajo también. Deben saberlo en kiesha de marquita emergencia.  SOLICITE ATENCIÓN MÉDICA SI:   · La incisión se vuelve eusebio, se hincha o duele.  · La incisión supura líquido o efrain.  · Tiene náuseas, se siente más somnoliento de lo habitual o tiene picazón.  · Tiene dificultad para orinar o  el intestino.  · Siente dolor de ravi o dolor de espalda.  · El dolor no se michelet con la bomba.  · Tiene fiebre de más de 100.5° F (38.1° C).  SOLICITE ATENCIÓN MÉDICA DE INMEDIATO SI:   · Tiene dificultad para respirar o le falta el aire.  · Siente un dolor de ravi que dura por más de dos días.  · Se oye el pitido de la bomba.  · Tiene síntomas súbitos (dolor de espalda, debilidad en las piernas).  · Pierde el control de la vejiga o de los intestinos.  · Tiene fiebre de más de 102.0° F (38.9° C).     Esta información no tiene og fin reemplazar el consejo del médico. Asegúrese de hacerle al médico cualquier pregunta que tenga.     Document Released: 04/14/2014 Document Revised: 01/08/2016  [employer] Interactive Patient Education ©2016 [employer]      Implantable Pain Pump Home Care  An implanted pain pump is a small device, about the size of a hockey puck. It is put under your skin (implanted) during surgery. Attached to it is a catheter (small plastic tube). The tube goes into either a vein or into the space around your spinal cord. The pump has a space (reservoir) where the pain medication is stored. The device pumps it from the reservoir into your body at a regular pre-set rate. Sometimes your health care provider will set the pump to give you pain medicine when you need it. This might be in a steady flow. Or, it might be different amounts at different times. The reservoir can be easily refilled when it runs low.  An implanted pump puts the  medication right where it needs to go to relieve your pain. This often means less medication will be needed. That should result in fewer side effects.   HOME CARE   An implanted pump can stay in place for a long time. But do not worry: It can be removed at any time. For example, if your condition changes or if the pump no longer helps, it can be taken out. While you have an implanted pump:  · You will need to make regular visits to your health care provider. During these visits:  ¨ The pump will be refilled with pain medicine. Your health care provider will insert a needle through your skin into the pump. Medicine will flow through the needle into the reservoir. Refills are usually needed every 4 to 8 weeks. Refill time varies from person to person. Be sure to ask how often your pump will need a refill.  ¨ Your medication can be adjusted. The amount you get can be changed. How often the medicine is pumped into your body also can be changed. These changes will depend on how well the device is reducing your pain. Be honest when describing your pain to your health care providers. They need this information to make sure you get the right amount and right type of pain medicine. It also will depend on side effects. Explain any symptoms you have, even if you do not think they have anything to do with your pain pump. Tell your caregiver if you feel sick to your stomach, if you are overly sleepy, or if your skin itches. Your health care provider will know whether the medicine might be causing these or other side effects. The goal is to give you enough medicine to ease your pain but not so much that you have side effects.  ¨ The pump will be checked to make sure it is working properly. The battery in the pump often lasts up to ten years. The pump will beep if a new battery is needed. It also will beep if it needs a refill or if there is another problem.  Ask your health care provider whether you can take over-the-counter  medicines for aches or fever. Which other medicines you can take could depend on the type of pain medicine in your pump.   At first, you will need to restrict your movement and activities.  · For 6 to 8 weeks:  ¨ Do not sleep on your stomach.  ¨ Avoid excessive bending and stretching.  ¨ Do not raise your arms over your head.  ¨ Do not lift anything that weighs more than 5 pounds.  · Do not drive for at least two weeks (or until your health care provider says it is okay).  · Do not take a tub bath for about a month. Shower only.  · Do not do work around the house (inside or outside) until your health care provider gives you the go-ahead. For example, do not load the . Do not vacuum. Do not mow the yard.  Over the long term, you may not have many restrictions. Just remember to return to your regular activities gradually. Ask your health care provider whether physical therapy would help. You still need to be careful in some situations:  · Always keep an eye on the skin that covers the pump. Call your health care provider if it becomes red, warm or swollen. Call if the area is painful or if the incision starts to leak blood or any liquid. These could be signs of infection.  · Get a special ID card that proves you have an implanted pump. It is called an Implanted Device Identification Card. To get this, you might need a letter or form signed by your health care provider. Ask your health care provider how to get this ID card. You will need this card when traveling. It is important to have in case of an emergency. Carry it with you at all times.  · You should alert airport security checkers that you have an implanted pump. The pump will set off the alarms in the security checkpoints. Show your ID card. Ask to be checked with a security wand.  · Do not worry about being around microwave ovens, cell phones, or other electronic devices. They will not harm the pump.  · Make sure that family members and close friends  know that you have an implanted pump. You might want to tell some co-workers, too. People need to know this in case of an emergency.  SEEK MEDICAL CARE IF:   · An incision becomes red, swollen, or painful.  · An incision leaks fluid or blood.  · You feel sick to your stomach, you feel more sleepy than usual, or you have itchy skin.  · You have trouble urinating or having a bowel movement.  · You have a bad headache or back pain.  · Your pain is not being relieved by the pump.  · You develop a fever of more than 100.5° F (38.1° C).  SEEK IMMEDIATE MEDICAL CARE IF:  · You have trouble breathing or feel short of breath.  · You have a headache that lasts for more than two days.  · You hear the pump beeping.  · You have sudden symptoms (back pain, weakness in your legs).  · You lose control over urination or bowel movements.  · You develop a fever of more than 102.0° F (38.9° C).     This information is not intended to replace advice given to you by your health care provider. Make sure you discuss any questions you have with your health care provider.     Document Released: 04/05/2010 Document Revised: 01/08/2016 Document Reviewed: 04/05/2010  [employer] Interactive Patient Education ©2016 [employer]      · Is patient discharged on Warfarin / Coumadin?   No     Depression / Suicide Risk    As you are discharged from this [facility], it is important to learn how to keep safe from harming yourself.    Recognize the warning signs:  · Abrupt changes in personality, positive or negative- including increase in energy   · Giving away possessions  · Change in eating patterns- significant weight changes-  positive or negative  · Change in sleeping patterns- unable to sleep or sleeping all the time   · Unwillingness or inability to communicate  · Depression  · Unusual sadness, discouragement and loneliness  · Talk of wanting to die  · Neglect of personal appearance   · Rebelliousness- reckless behavior  · Withdrawal  from people/activities they love  · Confusion- inability to concentrate     If you or a loved one observes any of these behaviors or has concerns about self-harm, here's what you can do:  · Talk about it- your feelings and reasons for harming yourself  · Remove any means that you might use to hurt yourself (examples: pills, rope, extension cords, firearm)  · Get professional help from the community (Mental Health, Substance Abuse, psychological counseling)  · Do not be alone:Call your Safe Contact- someone whom you trust who will be there for you.  · Call your local CRISIS HOTLINE 406-0165 or 815-955-2887  · Call your local Children's Mobile Crisis Response Team [place] (239) 404-4639 or www.[employer]  · Call the toll free National Suicide Prevention Hotlines   · National Suicide Prevention Lifeline 254-268-CPPM (0672)  · National Hope Line Network 800-SUICIDE (794-2695)

## 2019-10-18 NOTE — DIETARY
NUTRITION SERVICES: BMI - Pt with BMI >40 (=Body mass index is 49.78 kg/m².), morbid obesity. Weight loss counseling not appropriate in acute care setting. RECOMMEND - Referral to outpatient nutrition services for weight management after D/C.

## 2019-10-21 NOTE — OP REPORT
Surgeon: Gail Hernandez MD  Assistants: None    Chronic pain syndrome due to chronic pain syndrome, lumbar spondylosis , failed Back surgery syndrome    Procedure    1- Intraoperative fluoroscopy for placement of intrathecal catheter  2- Implantation of infusion pump    Anesthesia: General Anesthesia with endotracheal tube.     Site: [facility]    since she demonstrated favorable response to trial dose of intrathecal opioid with adequately acceptable pain relief and less degree of side effects . Informed consent obtained     Description of Procedure  Preparation : After proper identification, the patient was brought to the Operating Room and placed in the prone position. Care was taken during positioning to protect and pad all pressure points. Antibiotic was administered as preoperative antibiotic prophylaxis. After administration of general anesthesia, the back was prepped and draped in the usual sterile fashion using chlorhexidine and sterile drape. The fluoroscope unit was sterilely draped and brought into the field.     INTRATHECAL Catheter placement  The skin overlying the L3-L4 interspace was marked and anesthetized with lidocaine 1% with epinephrine. Then by using blade size 10, the incision was made in midline with the length of 5 inches. Subcutaneous tissue was dissected and hemostasis was achieved with bovie A 15 G tuohy needle was introduced into the L3- L4 interspace intrathecally under fluoroscopic guidance, the catheter was then advanced until the tip reached the tip of needle. After confirming catheter position by fluoroscopy, the tip of catheter was placed at the mid T6 intrathecal space. Catheter was secured to the fascia by anchoring system and pursing suture was made by 2.0 ethibond suture. The tuohy needle and stylet were then withdrawn. The area was irrigate with copious saline and bacitracin. The free flow of CSF was observed from the end of catheter.      Implantation of  pump    Creation of pocket  Attention was turned to creating a pocket for the pump left lateral to midline incision . The subcutaneous tissue were infiltrated with lidocaine 1% with epinephrine. Using (a combination of) sharp dissection/ blunt dissection/electrocautery, a pocket was created about 2 cm below the skin. The pocket was then irrigated with normal saline and bacitracin. Hemostasis was obtained by bovie.    Implantation of pump and connection to catheter  Catheter was then connected to the 20 ml Flownix , Prometra 2 pump using 2.0 ethibond .  The connectors were all tested to ensure continuity of the system. The pump was placed into the subcutaneous pocket. The pump was tacked within the pocket to prevent migration by 2.0 ethobond suture.    Pump Filling  Electronic analysis of the pump was performed to ensure proper functioning. Brand needle was placed into access port and free flow of CSF aspirated from the site. The pump was then filled with Hydromorphone 1mg/ml, bupivacaine 6.75 mg/ml . The pump was programmed.    Closure  Positioning of the catheter was rechecked and confirmed by fluoroscopy. Wounds were copiously irrigated.  Incisions were closed with interrupted 2.0 vicryl in the subcutaneous layer, staples in the dermis and epidermis. sterile occlusive dressings were applied. All sponge, needle and instrument counts were correct. The patient tolerated the procedure well. One gram of vancomycin was used inside the pocket. Bacitracin ointment\ was applied over the staples    Blood loss: Minimal  Complication: None      Disposition:  1. Patient transferred to PACU  2. Discharge from PACU when discharge criteria are met.  2- 23 hours post op observation  3. Follow up in 10-14 days in the office.

## 2019-10-21 NOTE — PROGRESS NOTES
Patient underwent implantation of pain pump flowonxi on 10/17/19. POD1#1, She is stable, her vitals are stable and  the pain is relatively controlled.    Plan: She can be discharged home and follow up with the clinic in 10 days.

## 2019-12-13 ENCOUNTER — HOSPITAL ENCOUNTER (OUTPATIENT)
Facility: MEDICAL CENTER | Age: 64
End: 2019-12-13
Attending: PAIN MEDICINE | Admitting: PAIN MEDICINE
Payer: MEDICARE

## 2019-12-13 LAB
GRAM STN SPEC: NORMAL
SIGNIFICANT IND 70042: NORMAL
SITE SITE: NORMAL
SOURCE SOURCE: NORMAL

## 2019-12-13 PROCEDURE — 501838 HCHG SUTURE GENERAL: Performed by: PAIN MEDICINE

## 2019-12-13 PROCEDURE — 160002 HCHG RECOVERY MINUTES (STAT): Performed by: PAIN MEDICINE

## 2019-12-13 PROCEDURE — 160039 HCHG SURGERY MINUTES - EA ADDL 1 MIN LEVEL 3: Performed by: PAIN MEDICINE

## 2019-12-13 PROCEDURE — 700102 HCHG RX REV CODE 250 W/ 637 OVERRIDE(OP)

## 2019-12-13 PROCEDURE — 500389 HCHG DRAIN, RESERVOIR SUCT JP 100CC: Performed by: PAIN MEDICINE

## 2019-12-13 PROCEDURE — 160009 HCHG ANES TIME/MIN: Performed by: PAIN MEDICINE

## 2019-12-13 PROCEDURE — A9270 NON-COVERED ITEM OR SERVICE: HCPCS

## 2019-12-13 PROCEDURE — 700111 HCHG RX REV CODE 636 W/ 250 OVERRIDE (IP)

## 2019-12-13 PROCEDURE — 700101 HCHG RX REV CODE 250

## 2019-12-13 PROCEDURE — 87186 SC STD MICRODIL/AGAR DIL: CPT

## 2019-12-13 PROCEDURE — 500374 HCHG DRAIN, J-P FLAT 7MM FULL: Performed by: PAIN MEDICINE

## 2019-12-13 PROCEDURE — 160048 HCHG OR STATISTICAL LEVEL 1-5: Performed by: PAIN MEDICINE

## 2019-12-13 PROCEDURE — 87205 SMEAR GRAM STAIN: CPT

## 2019-12-13 PROCEDURE — 110454 HCHG SHELL REV 250: Performed by: PAIN MEDICINE

## 2019-12-13 PROCEDURE — 160028 HCHG SURGERY MINUTES - 1ST 30 MINS LEVEL 3: Performed by: PAIN MEDICINE

## 2019-12-13 PROCEDURE — 160046 HCHG PACU - 1ST 60 MINS PHASE II: Performed by: PAIN MEDICINE

## 2019-12-13 PROCEDURE — 87075 CULTR BACTERIA EXCEPT BLOOD: CPT

## 2019-12-13 PROCEDURE — 160036 HCHG PACU - EA ADDL 30 MINS PHASE I: Performed by: PAIN MEDICINE

## 2019-12-13 PROCEDURE — 160025 RECOVERY II MINUTES (STATS): Performed by: PAIN MEDICINE

## 2019-12-13 PROCEDURE — 160035 HCHG PACU - 1ST 60 MINS PHASE I: Performed by: PAIN MEDICINE

## 2019-12-13 PROCEDURE — 160047 HCHG PACU  - EA ADDL 30 MINS PHASE II: Performed by: PAIN MEDICINE

## 2019-12-13 PROCEDURE — 87070 CULTURE OTHR SPECIMN AEROBIC: CPT

## 2019-12-13 PROCEDURE — 87077 CULTURE AEROBIC IDENTIFY: CPT

## 2019-12-13 RX ORDER — HALOPERIDOL 5 MG/ML
INJECTION INTRAMUSCULAR
Status: DISCONTINUED
Start: 2019-12-13 | End: 2019-12-14 | Stop reason: HOSPADM

## 2019-12-13 RX ORDER — OXYCODONE HCL 5 MG/5 ML
SOLUTION, ORAL ORAL
Status: DISCONTINUED
Start: 2019-12-13 | End: 2019-12-14 | Stop reason: HOSPADM

## 2019-12-13 RX ORDER — MEPERIDINE HYDROCHLORIDE 25 MG/ML
INJECTION INTRAMUSCULAR; INTRAVENOUS; SUBCUTANEOUS
Status: DISCONTINUED
Start: 2019-12-13 | End: 2019-12-14 | Stop reason: HOSPADM

## 2019-12-13 RX ORDER — LIDOCAINE HYDROCHLORIDE AND EPINEPHRINE 10; 10 MG/ML; UG/ML
INJECTION, SOLUTION INFILTRATION; PERINEURAL
Status: DISCONTINUED
Start: 2019-12-13 | End: 2019-12-14 | Stop reason: HOSPADM

## 2019-12-16 LAB
BACTERIA SPEC ANAEROBE CULT: NORMAL
BACTERIA WND AEROBE CULT: ABNORMAL
BACTERIA WND AEROBE CULT: ABNORMAL
GRAM STN SPEC: ABNORMAL
SIGNIFICANT IND 70042: ABNORMAL
SIGNIFICANT IND 70042: NORMAL
SITE SITE: ABNORMAL
SITE SITE: NORMAL
SOURCE SOURCE: ABNORMAL
SOURCE SOURCE: NORMAL

## 2019-12-22 NOTE — OP REPORT
Assistants: None    Surgeon: Gail Hernandez MD    ATTENDING: Gail Hernandez MD  Site: [facility]     ASSISTANT: None  PREOPERATIVE DIAGNOSIS: Chronic pain syndrome, Wound dehiscence   POSTOPERATIVE DIAGNOSIS: Chronic pain syndrome, Wound dehiscnece    PROCEDURE PERFORMED:  1. Removal of intrathecal catheter   2. Removal of the flowonix pain pump   3. Irrigation and excisional debridement of wound the pocket site   4. DIANE drain placement       ANESTHESIA: GA with ETT    MONITORS: Automatic blood pressure cuff and pulse oximetry, EKG    INDICATIONS: She presented with wound dehiscence which exposed the pain pump    ALLERGIES: please see the chart  REVIEW OF SYSTEMS: Negative for fever, chills, chest pain, SOB, bleeding abnormalities, nausea, vomiting, diarrhea    FOCUSED PHYSICAL EXAMINATION: The patient is awake, alert and oriented, and is in no acute distress. Vital signs are stable. The patient is afebrile. The rest of the PE is essentially unchanged from the patient's recent visit to our office.    We explained the procedure to the patient including the risks, benefits and alternatives to the procedure. The patient verbalized understanding and was willing to proceed.    PROCEDURE IN DETAIL: An informed consent was obtained. The patient received Ancef intravenously before the procedure. The patient was taken to the procedure room and was positively identified by the staff and the attending physician. The patient was positioned prone on the procedure bed. All bony prominences were checked and padded. Vital signs were monitored as above and remained stable throughout the procedure. The skin was prepped and draped in the standard sterile fashion. A surgical pause (time-out) was performed and was agreed upon by the members of the team. The skin and subcutaneous tissues were infiltrated with liodcaine 1% mixed with epinephrine and bupivacien 0.25% . Scalpel 10 was used and cut through the old incision along with dehisced  area f the wound over her lower back. Aneaerobic and Aerobic culture was obtained, Sharp and blunt dissection was utilized by Andres and bovchau. Full hemostasis was obtained with compression. The Catheter and pain pump both were exposed, the suture was cut the catheter was removed intact along with the pain pump. After that we started excisional debridement and irrigation of the wound. We used Scalpel blade 10 along with andres to excise the necrotic and non viable tissue. The tissue appeared non infected but fibrotic. The size of wound was 5 cm width T shape. After the debridement the size was 6 cm and the depth was 2 cm after debridement. the excisional debridement included the edge of non viable skin and the subcutaneous tissue and fat. The specimen was removed and disposed. After that two DIANE drain was placed inside the wound   The subcutaneous tissues were approximated using 2-0 Vicryl. The skin was approximated using staples. Triple antibiotic was applied over the incision. The incision was covered by gauze, Telfa and tape. The DIANE drains were secured with Nyelon.  The patient was transferred in stable condition to the Recovery Room.     Blood loss was minimal    COMPLICATIONS: None.    DISPOSITION:  1. Discharge the patient home when the discharge criteria are met.  2. follow up with the clinic in 10-14 day  3- Follow up with the wound clinic

## 2019-12-28 NOTE — PROGRESS NOTES
2 RN skin assessment done; skin not WDL. See Wound flowsheet.Mid-  Lower back incision.(pain pump site).   abdominal pain

## 2020-06-23 ENCOUNTER — APPOINTMENT (OUTPATIENT)
Dept: RADIOLOGY | Facility: MEDICAL CENTER | Age: 65
End: 2020-06-23
Attending: PAIN MEDICINE
Payer: MEDICARE

## 2020-06-23 ENCOUNTER — ANESTHESIA EVENT (OUTPATIENT)
Dept: SURGERY | Facility: MEDICAL CENTER | Age: 65
End: 2020-06-23
Payer: MEDICARE

## 2020-06-23 ENCOUNTER — ANESTHESIA (OUTPATIENT)
Dept: SURGERY | Facility: MEDICAL CENTER | Age: 65
End: 2020-06-23
Payer: MEDICARE

## 2020-06-23 ENCOUNTER — HOSPITAL ENCOUNTER (OUTPATIENT)
Facility: MEDICAL CENTER | Age: 65
End: 2020-06-23
Attending: PAIN MEDICINE | Admitting: PAIN MEDICINE
Payer: MEDICARE

## 2020-06-23 VITALS
HEART RATE: 90 BPM | DIASTOLIC BLOOD PRESSURE: 71 MMHG | RESPIRATION RATE: 20 BRPM | OXYGEN SATURATION: 97 % | BODY MASS INDEX: 50.03 KG/M2 | HEIGHT: 61 IN | TEMPERATURE: 97.2 F | SYSTOLIC BLOOD PRESSURE: 130 MMHG | WEIGHT: 264.99 LBS

## 2020-06-23 LAB
ANION GAP SERPL CALC-SCNC: 14 MMOL/L (ref 7–16)
BASOPHILS # BLD AUTO: 0.3 % (ref 0–1.8)
BASOPHILS # BLD: 0.02 K/UL (ref 0–0.12)
BUN SERPL-MCNC: 44 MG/DL (ref 8–22)
CALCIUM SERPL-MCNC: 7 MG/DL (ref 8.4–10.2)
CHLORIDE SERPL-SCNC: 106 MMOL/L (ref 96–112)
CO2 SERPL-SCNC: 20 MMOL/L (ref 20–33)
CREAT SERPL-MCNC: 1.58 MG/DL (ref 0.5–1.4)
EKG IMPRESSION: NORMAL
EOSINOPHIL # BLD AUTO: 0.04 K/UL (ref 0–0.51)
EOSINOPHIL NFR BLD: 0.5 % (ref 0–6.9)
ERYTHROCYTE [DISTWIDTH] IN BLOOD BY AUTOMATED COUNT: 54.5 FL (ref 35.9–50)
GLUCOSE SERPL-MCNC: 101 MG/DL (ref 65–99)
HCT VFR BLD AUTO: 31.7 % (ref 37–47)
HGB BLD-MCNC: 9.5 G/DL (ref 12–16)
IMM GRANULOCYTES # BLD AUTO: 0.03 K/UL (ref 0–0.11)
IMM GRANULOCYTES NFR BLD AUTO: 0.4 % (ref 0–0.9)
LYMPHOCYTES # BLD AUTO: 0.66 K/UL (ref 1–4.8)
LYMPHOCYTES NFR BLD: 8.5 % (ref 22–41)
MCH RBC QN AUTO: 28.2 PG (ref 27–33)
MCHC RBC AUTO-ENTMCNC: 30 G/DL (ref 33.6–35)
MCV RBC AUTO: 94.1 FL (ref 81.4–97.8)
MONOCYTES # BLD AUTO: 0.47 K/UL (ref 0–0.85)
MONOCYTES NFR BLD AUTO: 6.1 % (ref 0–13.4)
NEUTROPHILS # BLD AUTO: 6.53 K/UL (ref 2–7.15)
NEUTROPHILS NFR BLD: 84.2 % (ref 44–72)
NRBC # BLD AUTO: 0 K/UL
NRBC BLD-RTO: 0 /100 WBC
PLATELET # BLD AUTO: 163 K/UL (ref 164–446)
PMV BLD AUTO: 11.1 FL (ref 9–12.9)
POTASSIUM SERPL-SCNC: 5.5 MMOL/L (ref 3.6–5.5)
RBC # BLD AUTO: 3.37 M/UL (ref 4.2–5.4)
SODIUM SERPL-SCNC: 140 MMOL/L (ref 135–145)
WBC # BLD AUTO: 7.8 K/UL (ref 4.8–10.8)

## 2020-06-23 PROCEDURE — 700101 HCHG RX REV CODE 250: Performed by: PAIN MEDICINE

## 2020-06-23 PROCEDURE — 160029 HCHG SURGERY MINUTES - 1ST 30 MINS LEVEL 4: Performed by: PAIN MEDICINE

## 2020-06-23 PROCEDURE — 700111 HCHG RX REV CODE 636 W/ 250 OVERRIDE (IP): Performed by: PAIN MEDICINE

## 2020-06-23 PROCEDURE — 500448 HCHG DRESSING, TELFA 3X4: Performed by: PAIN MEDICINE

## 2020-06-23 PROCEDURE — 87070 CULTURE OTHR SPECIMN AEROBIC: CPT

## 2020-06-23 PROCEDURE — 80048 BASIC METABOLIC PNL TOTAL CA: CPT

## 2020-06-23 PROCEDURE — 700105 HCHG RX REV CODE 258: Performed by: PAIN MEDICINE

## 2020-06-23 PROCEDURE — 93010 ELECTROCARDIOGRAM REPORT: CPT | Performed by: INTERNAL MEDICINE

## 2020-06-23 PROCEDURE — 72070 X-RAY EXAM THORAC SPINE 2VWS: CPT

## 2020-06-23 PROCEDURE — A9270 NON-COVERED ITEM OR SERVICE: HCPCS

## 2020-06-23 PROCEDURE — 87205 SMEAR GRAM STAIN: CPT

## 2020-06-23 PROCEDURE — 700102 HCHG RX REV CODE 250 W/ 637 OVERRIDE(OP)

## 2020-06-23 PROCEDURE — 700111 HCHG RX REV CODE 636 W/ 250 OVERRIDE (IP): Performed by: ANESTHESIOLOGY

## 2020-06-23 PROCEDURE — 160025 RECOVERY II MINUTES (STATS): Performed by: PAIN MEDICINE

## 2020-06-23 PROCEDURE — 87075 CULTR BACTERIA EXCEPT BLOOD: CPT

## 2020-06-23 PROCEDURE — 160036 HCHG PACU - EA ADDL 30 MINS PHASE I: Performed by: PAIN MEDICINE

## 2020-06-23 PROCEDURE — 160046 HCHG PACU - 1ST 60 MINS PHASE II: Performed by: PAIN MEDICINE

## 2020-06-23 PROCEDURE — 160048 HCHG OR STATISTICAL LEVEL 1-5: Performed by: PAIN MEDICINE

## 2020-06-23 PROCEDURE — 93005 ELECTROCARDIOGRAM TRACING: CPT | Performed by: PAIN MEDICINE

## 2020-06-23 PROCEDURE — 85025 COMPLETE CBC W/AUTO DIFF WBC: CPT

## 2020-06-23 PROCEDURE — 160041 HCHG SURGERY MINUTES - EA ADDL 1 MIN LEVEL 4: Performed by: PAIN MEDICINE

## 2020-06-23 PROCEDURE — 160009 HCHG ANES TIME/MIN: Performed by: PAIN MEDICINE

## 2020-06-23 PROCEDURE — A6402 STERILE GAUZE <= 16 SQ IN: HCPCS | Performed by: PAIN MEDICINE

## 2020-06-23 PROCEDURE — 160035 HCHG PACU - 1ST 60 MINS PHASE I: Performed by: PAIN MEDICINE

## 2020-06-23 PROCEDURE — 160002 HCHG RECOVERY MINUTES (STAT): Performed by: PAIN MEDICINE

## 2020-06-23 RX ORDER — SODIUM CHLORIDE, SODIUM LACTATE, POTASSIUM CHLORIDE, CALCIUM CHLORIDE 600; 310; 30; 20 MG/100ML; MG/100ML; MG/100ML; MG/100ML
INJECTION, SOLUTION INTRAVENOUS CONTINUOUS
Status: DISCONTINUED | OUTPATIENT
Start: 2020-06-23 | End: 2020-06-23 | Stop reason: HOSPADM

## 2020-06-23 RX ORDER — BUPIVACAINE HYDROCHLORIDE 5 MG/ML
INJECTION, SOLUTION EPIDURAL; INTRACAUDAL
Status: DISCONTINUED | OUTPATIENT
Start: 2020-06-23 | End: 2020-06-23 | Stop reason: HOSPADM

## 2020-06-23 RX ORDER — MEPERIDINE HYDROCHLORIDE 25 MG/ML
12.5 INJECTION INTRAMUSCULAR; INTRAVENOUS; SUBCUTANEOUS
Status: DISCONTINUED | OUTPATIENT
Start: 2020-06-23 | End: 2020-06-23 | Stop reason: HOSPADM

## 2020-06-23 RX ORDER — OXYCODONE HCL 5 MG/5 ML
5 SOLUTION, ORAL ORAL
Status: DISCONTINUED | OUTPATIENT
Start: 2020-06-23 | End: 2020-06-23 | Stop reason: HOSPADM

## 2020-06-23 RX ORDER — HYDROMORPHONE HYDROCHLORIDE 1 MG/ML
0.1 INJECTION, SOLUTION INTRAMUSCULAR; INTRAVENOUS; SUBCUTANEOUS
Status: DISCONTINUED | OUTPATIENT
Start: 2020-06-23 | End: 2020-06-23 | Stop reason: HOSPADM

## 2020-06-23 RX ORDER — BUPIVACAINE HYDROCHLORIDE 2.5 MG/ML
INJECTION, SOLUTION EPIDURAL; INFILTRATION; INTRACAUDAL
Status: DISCONTINUED | OUTPATIENT
Start: 2020-06-23 | End: 2020-06-23 | Stop reason: HOSPADM

## 2020-06-23 RX ORDER — HYDROMORPHONE HYDROCHLORIDE 1 MG/ML
0.2 INJECTION, SOLUTION INTRAMUSCULAR; INTRAVENOUS; SUBCUTANEOUS
Status: DISCONTINUED | OUTPATIENT
Start: 2020-06-23 | End: 2020-06-23 | Stop reason: HOSPADM

## 2020-06-23 RX ORDER — HALOPERIDOL 5 MG/ML
1 INJECTION INTRAMUSCULAR
Status: DISCONTINUED | OUTPATIENT
Start: 2020-06-23 | End: 2020-06-23 | Stop reason: HOSPADM

## 2020-06-23 RX ORDER — OXYCODONE HCL 5 MG/5 ML
10 SOLUTION, ORAL ORAL
Status: DISCONTINUED | OUTPATIENT
Start: 2020-06-23 | End: 2020-06-23 | Stop reason: HOSPADM

## 2020-06-23 RX ORDER — DIPHENHYDRAMINE HYDROCHLORIDE 50 MG/ML
12.5 INJECTION INTRAMUSCULAR; INTRAVENOUS
Status: DISCONTINUED | OUTPATIENT
Start: 2020-06-23 | End: 2020-06-23 | Stop reason: HOSPADM

## 2020-06-23 RX ORDER — ONDANSETRON 2 MG/ML
4 INJECTION INTRAMUSCULAR; INTRAVENOUS
Status: DISCONTINUED | OUTPATIENT
Start: 2020-06-23 | End: 2020-06-23 | Stop reason: HOSPADM

## 2020-06-23 RX ORDER — CEFAZOLIN SODIUM 1 G/3ML
INJECTION, POWDER, FOR SOLUTION INTRAMUSCULAR; INTRAVENOUS PRN
Status: DISCONTINUED | OUTPATIENT
Start: 2020-06-23 | End: 2020-06-23 | Stop reason: SURG

## 2020-06-23 RX ORDER — SODIUM CHLORIDE, SODIUM LACTATE, POTASSIUM CHLORIDE, CALCIUM CHLORIDE 600; 310; 30; 20 MG/100ML; MG/100ML; MG/100ML; MG/100ML
1000 INJECTION, SOLUTION INTRAVENOUS
Status: DISCONTINUED | OUTPATIENT
Start: 2020-06-23 | End: 2020-06-23 | Stop reason: HOSPADM

## 2020-06-23 RX ORDER — HYDROMORPHONE HYDROCHLORIDE 1 MG/ML
0.4 INJECTION, SOLUTION INTRAMUSCULAR; INTRAVENOUS; SUBCUTANEOUS
Status: DISCONTINUED | OUTPATIENT
Start: 2020-06-23 | End: 2020-06-23 | Stop reason: HOSPADM

## 2020-06-23 RX ADMIN — FENTANYL CITRATE 25 MCG: 50 INJECTION, SOLUTION INTRAMUSCULAR; INTRAVENOUS at 15:07

## 2020-06-23 RX ADMIN — MIDAZOLAM HYDROCHLORIDE 1 MG: 1 INJECTION, SOLUTION INTRAMUSCULAR; INTRAVENOUS at 15:05

## 2020-06-23 RX ADMIN — SODIUM CHLORIDE, POTASSIUM CHLORIDE, SODIUM LACTATE AND CALCIUM CHLORIDE: 600; 310; 30; 20 INJECTION, SOLUTION INTRAVENOUS at 15:05

## 2020-06-23 RX ADMIN — CEFAZOLIN 2 G: 1 INJECTION, POWDER, FOR SOLUTION INTRAVENOUS at 15:05

## 2020-06-23 RX ADMIN — FENTANYL CITRATE 50 MCG: 50 INJECTION, SOLUTION INTRAMUSCULAR; INTRAVENOUS at 15:11

## 2020-06-23 RX ADMIN — LIDOCAINE HYDROCHLORIDE 0.5 ML: 10 INJECTION, SOLUTION INFILTRATION; PERINEURAL at 14:20

## 2020-06-23 RX ADMIN — POVIDONE-IODINE 15 ML: 10 SOLUTION TOPICAL at 14:19

## 2020-06-23 RX ADMIN — MIDAZOLAM HYDROCHLORIDE 1 MG: 1 INJECTION, SOLUTION INTRAMUSCULAR; INTRAVENOUS at 15:08

## 2020-06-23 RX ADMIN — SODIUM CHLORIDE, POTASSIUM CHLORIDE, SODIUM LACTATE AND CALCIUM CHLORIDE 1000 ML: 600; 310; 30; 20 INJECTION, SOLUTION INTRAVENOUS at 14:22

## 2020-06-23 ASSESSMENT — PAIN SCALES - GENERAL: PAIN_LEVEL: 3

## 2020-06-23 NOTE — ANESTHESIA TIME REPORT
Anesthesia Start and Stop Event Times     Date Time Event    6/23/2020 1505 Ready for Procedure     1505 Anesthesia Start     1545 Anesthesia Stop        Responsible Staff  06/23/20    Name Role Begin End    Israel Reilly M.D. Anesth 1505 1540        Preop Diagnosis (Free Text):  Pre-op Diagnosis     POST LAMINECTOMY SYNDROME        Preop Diagnosis (Codes):    Post op Diagnosis  Post laminectomy syndrome      Premium Reason  Non-Premium    Comments:

## 2020-06-23 NOTE — OR NURSING
1542: To PACU from OR via gurney, respirations spontaneous and non-labored. Lumbar large gauze/tape dressings c/d/i. Plan to keep pt in PACU for full hour per [facility] protocol.  1545: Pt placed on own CPAP at home settings with 2.5 LPM O2 bled in as per pt states she uses at home. HOB 60 dgrees  1555: Pericare given and linens changed post void via bedpan and in to bed. Pt noted to have c/d/i dressing to RLE - states it is being taken care of by wound care team.  1610: No change.  1625: has been sleeping, roused, DB&C, CPAP d/inocente, commenced po water. Pt states current pain level tolerable.  1640: No change in surgical site assessment.  1642: Meets criteria to transfer to Stage 2

## 2020-06-23 NOTE — OR NURSING
1650: Patient arrived in phase II from PACU.    1729: D/Juan to care of family post uneventful stay in PACU 2.

## 2020-06-23 NOTE — ANESTHESIA PREPROCEDURE EVALUATION
Relevant Problems   ANESTHESIA   (+) GEOVANNA treated with BiPAP      CARDIAC   (+) HTN (hypertension)         (+) Chronic renal impairment, stage 3 (moderate) (HCC)       Physical Exam    Airway   Mallampati: II  TM distance: >3 FB  Neck ROM: full       Cardiovascular - normal exam  Rhythm: regular  Rate: normal  (-) murmur     Dental - normal exam           Pulmonary - normal exam  Breath sounds clear to auscultation     Abdominal   (+) obese  Abdomen: soft  Bowel sounds: normal   Neurological - normal exam                 Anesthesia Plan    ASA 3   ASA physical status 3 criteria: morbid obesity - BMI greater than or equal to 40    Plan - MAC             Induction: intravenous    Postoperative Plan: Postoperative administration of opioids is intended.    Pertinent diagnostic labs and testing reviewed    Informed Consent:    Anesthetic plan and risks discussed with patient.    Use of blood products discussed with: patient whom consented to blood products.

## 2020-06-23 NOTE — ANESTHESIA POSTPROCEDURE EVALUATION
Patient: Charis Stinson    Procedure Summary     Date:  06/23/20 Room / Location:   OR  / SURGERY [facility]    Anesthesia Start:  1505 Anesthesia Stop:  1545    Procedure:  INSERTION, NEUROSTIMULATOR, PERMANENT, SPINAL CORD - FOR EXPLANT (N/A Back) Diagnosis:  (POST LAMINECTOMY SYNDROME)    Surgeon:  Gail Hernandez M.D. Responsible Provider:  Israel Reilly M.D.    Anesthesia Type:  MAC ASA Status:  3          Final Anesthesia Type: MAC  Last vitals  BP   Blood Pressure : 130/71    Temp   36.5 °C (97.7 °F)    Pulse   Pulse: 87   Resp   16    SpO2   96 %      Anesthesia Post Evaluation    Patient location during evaluation: PACU  Patient participation: complete - patient participated  Level of consciousness: awake and alert  Pain score: 3    Airway patency: patent  Anesthetic complications: no  Cardiovascular status: hemodynamically stable  Respiratory status: acceptable  Hydration status: euvolemic    PONV: none           Nurse Pain Score: 7 (NPRS)

## 2020-06-23 NOTE — ANESTHESIA QCDR
2019 [facility] Clinical Data Registry (for Quality Improvement)     Postoperative nausea/vomiting risk protocol (Adult = 18 yrs and Pediatric 3-17 yrs)- (430 and 463)  General inhalation anesthetic (NOT TIVA) with PONV risk factors: Yes  Provision of anti-emetic therapy with at least 2 different classes of agents: Yes   Patient DID NOT receive anti-emetic therapy and reason is documented in Medical Record:  N/A    Multimodal Pain Management- (477)  Non-emergent surgery AND patient age >= 18:   Use of Multimodal Pain Management, two or more drugs and/or interventions, NOT including systemic opioids:   Exception: Documented allergy to multiple classes of analgesics:     Smoking Abstinence (404)  Patient is current smoker (cigarette, pipe, e-cig, marijuanna):   Elective Surgery:   Abstinence instructions provided prior to day of surgery:   Patient abstained from smoking on day of surgery:     Pre-Op Beta-Blocker in Isolated CABG (44)  Isolated CABG AND patient age >= 18:   Beta-blocker admin within 24 hours of surgical incision:   Exception:of medical reason(s) for not administering beta blocker within 24 hours prior to surgical incision (e.g., not  indicated,other medical reason):     PACU assessment of acute postoperative pain prior to Anesthesia Care End- Applies to Patients Age = 18- (ABG7)  Initial PACU pain score is which of the following: < 7/10  Patient unable to report pain score: N/A    Post-anesthetic transfer of care checklist/protocol to PACU/ICU- (426 and 427)  Upon conclusion of case, patient transferred to which of the following locations: PACU/Non-ICU  Use of transfer checklist/protocol: Yes  Exclusion: Service Performed in Patient Hospital Room (and thus did not require transfer): N/A  Unplanned admission to ICU related to anesthesia service up through end of PACU care- (MD51)  Unplanned admission to ICU (not initially anticipated at anesthesia start time): Yes

## 2020-06-23 NOTE — DISCHARGE INSTRUCTIONS
ACTIVITY: Rest and take it easy for the first 24 hours.  A responsible adult is recommended to remain with you during that time.  It is normal to feel sleepy.  We encourage you to not do anything that requires balance, judgment or coordination.    MILD FLU-LIKE SYMPTOMS ARE NORMAL. YOU MAY EXPERIENCE GENERALIZED MUSCLE ACHES, THROAT IRRITATION, HEADACHE AND/OR SOME NAUSEA.    FOR 24 HOURS DO NOT:  Drive, operate machinery or run household appliances.  Drink beer or alcoholic beverages.   Make important decisions or sign legal documents.    SPECIAL INSTRUCTIONS: Continue taking your antibiotics    DIET: To avoid nausea, slowly advance diet as tolerated, avoiding spicy or greasy foods for the first day.  Add more substantial food to your diet according to your physician's instructions.  INCREASE FLUIDS AND FIBER TO AVOID CONSTIPATION.    SURGICAL DRESSING/BATHING: Keep dressings clean, dry and intact until seen in wound clinic, sponge bathe only until then - no showers or tubs     FOLLOW-UP APPOINTMENT:  A follow-up appointment should be arranged with your doctor in 10 days; call to schedule.    You should CALL YOUR PHYSICIAN if you develop:  Fever greater than 101 degrees F.  Pain not relieved by medication, or persistent nausea or vomiting.  Excessive bleeding (blood soaking through dressing) or unexpected drainage from the wound.  Extreme redness or swelling around the incision site, drainage of pus or foul smelling drainage.  Inability to urinate or empty your bladder within 8 hours.  Problems with breathing or chest pain.    You should call 911 if you develop problems with breathing or chest pain.  If you are unable to contact your doctor or surgical center, you should go to the nearest emergency room or urgent care center.  Physician's telephone #: [phone]    If any questions arise, call your doctor.  If your doctor is not available, please feel free to call the Surgical Center at [phone].  The Center  is open Monday through Friday from 7AM to 7PM.  You can also call the HEALTH HOTLINE open 24 hours/day, 7 days/week and speak to a nurse at [phone], or toll free at [phone].    A registered nurse may call you a few days after your surgery to see how you are doing after your procedure.    MEDICATIONS: Resume taking daily medication.  Take prescribed pain medication with food.  If no medication is prescribed, you may take non-aspirin pain medication if needed.  PAIN MEDICATION CAN BE VERY CONSTIPATING.  Take a stool softener or laxative such as senokot, pericolace, or milk of magnesia if needed.    Prescription given for n/a.  Last pain medication given at n/a.    If your physician has prescribed pain medication that includes Acetaminophen (Tylenol), do not take additional Acetaminophen (Tylenol) while taking the prescribed medication.    Depression / Suicide Risk    As you are discharged from this [facility] facility, it is important to learn how to keep safe from harming yourself.    Recognize the warning signs:  · Abrupt changes in personality, positive or negative- including increase in energy   · Giving away possessions  · Change in eating patterns- significant weight changes-  positive or negative  · Change in sleeping patterns- unable to sleep or sleeping all the time   · Unwillingness or inability to communicate  · Depression  · Unusual sadness, discouragement and loneliness  · Talk of wanting to die  · Neglect of personal appearance   · Rebelliousness- reckless behavior  · Withdrawal from people/activities they love  · Confusion- inability to concentrate     If you or a loved one observes any of these behaviors or has concerns about self-harm, here's what you can do:  · Talk about it- your feelings and reasons for harming yourself  · Remove any means that you might use to hurt yourself (examples: pills, rope, extension cords, firearm)  · Get professional help from the community (Mental  Health, Substance Abuse, psychological counseling)  · Do not be alone:Call your Safe Contact- someone whom you trust who will be there for you.  · Call your local CRISIS HOTLINE 182-7855 or 999-774-8029  · Call your local Children's Mobile Crisis Response Team [place] (117) 074-5415 or www.[employer]  · Call the toll free National Suicide Prevention Hotlines   · National Suicide Prevention Lifeline 227-581-VWJG (5354)  [facility] Line Network 800-SUICIDE (230-4900)    Discharge Education for patients on GEOVANNA (Obstructive Sleep Apnea) Protocol    Prior to receiving sedation or anesthesia, we screen all patients for Obstructive Sleep Apnea.  During your screening, you were identified as having  Obstructive Sleep Apnea(GEOVANNA).    Sleep apnea is a chronic (ongoing) condition that disrupts the quality and quantity of your sleep repeatedly throughout the night.  This often results in excessive daytime sleepiness or fatigue during the day.  It may also contribute to high blood pressure, heart problems, and complications following medications used for surgery and procedures.    To establish a definitive diagnosis, further testing from a specialist would be needed.  We recommend that you follow up with your primary care physician.    We recommend that you should be with an adult observer for at least 24 hours after your sedation/anesthesia.  If you have a CPAP machine, you should wear it during any sleep period (day or night) for the week following your procedure.  We encourage you to sleep on your side or in a sitting position, even with napping.  Lying flat on your back increases the risk of apnea and airway obstruction during your post procedure recovery period.    It is important to prevent over-sedation that could increase your risk for apnea.  Please take all pain medication as directed by your physician.  If you are not getting pain relief, please contact your physician to discuss possible approaches to  relieving pain while minimizing medications that can affect your breathing and oxygen levels.

## 2020-06-24 LAB
GRAM STN SPEC: NORMAL
SIGNIFICANT IND 70042: NORMAL
SITE SITE: NORMAL
SOURCE SOURCE: NORMAL

## 2020-06-26 LAB
BACTERIA WND AEROBE CULT: ABNORMAL
BACTERIA WND AEROBE CULT: ABNORMAL
GRAM STN SPEC: ABNORMAL
SIGNIFICANT IND 70042: ABNORMAL
SITE SITE: ABNORMAL
SOURCE SOURCE: ABNORMAL

## 2020-06-28 LAB
BACTERIA SPEC ANAEROBE CULT: NORMAL
SIGNIFICANT IND 70042: NORMAL
SITE SITE: NORMAL
SOURCE SOURCE: NORMAL

## 2020-06-29 NOTE — OP REPORT
Assistants: None    Surgeon: Gail Hernandez MD      REMOVAL OF Implant AND EPIDURAL LEAD    ATTENDING: Gail Hernandez MD  Site: [facility]    ASSISTANT: None  PREOPERATIVE DIAGNOSIS: Chronic pain syndrome, failed back surgery syndrome, infected wound  POSTOPERATIVE DIAGNOSIS: Chronic pain syndrome,  failed back surgery syndrome, infected wound    PROCEDURE PERFORMED:  1. Removal of the thoracic epidural octrode leads ( two leads) of stimulator.  2. Removal of the internal pulse generator .  3. Irrigation of the pocket site      ANESTHESIA: MAC     MONITORS: Automatic blood pressure cuff and pulse oximetry, EKG    INDICATIONS: She presented with old wound and non-healing wound that migrated and extended to the IPG side , plan was to explant the whole system    ALLERGIES: please see the chart  REVIEW OF SYSTEMS: Negative for fever, chills, chest pain, SOB, bleeding abnormalities, nausea, vomiting, diarrhea    FOCUSED PHYSICAL EXAMINATION: The patient is awake, alert and oriented, and is in no acute distress. Vital signs are stable. The patient is afebrile. The rest of the PE is essentially unchanged from the patient's recent visit to our office.    We explained the procedure to the patient including the risks, benefits and alternatives to the procedure. The patient verbalized understanding and was willing to proceed.    PROCEDURE IN DETAIL: An informed consent was obtained. The patient received Ancef intravenously before the procedure. The patient was taken to the procedure room and was positively identified by the staff and the attending physician. The patient was positioned prone on the procedure bed. All bony prominences were checked and padded. Vital signs were monitored as above and remained stable throughout the procedure. The skin was prepped and draped in the standard sterile fashion. A surgical pause (time-out) was performed and was agreed upon by the members of the team. The skin and subcutaneous tissues were  infiltrated with liodcaine 1% mixed with epinephrine and bupivacien 0.25% . Scalpel 10 was used and cut through the old incision over her lower back and thoracic region. Sharp and blunt dissection was utilized. Full hemostasis was obtained with compression. The internal pulse generator and the two epidural leads of spinal cord stimulator were removed from the thoracic incision and lower back respectively. The culture was obtained. The incisions were checked for hemostasis repeatedly. The incision was irrigated with sterile normal saline. Due to the infection. I decided not to close the incision primarily. The antibiotic was applied over the incision. The pocket and wound was coveres with Kerlix Wet to dry and dressing applied.  patient was transferred in stable condition to the Recovery Room.    Blood loss was minimal    COMPLICATIONS: None.    DISPOSITION:  1. Discharge the patient home when the discharge criteria are met.  2. follow up with the [place] [facility] on 6/24/20 for wound dressing change.  3- F/U with pain clinic in 10 days.

## (undated) DEVICE — DRAPE LAPAROTOMY T SHEET - (12EA/CA)

## (undated) DEVICE — GOWN SURGEONS X-LARGE - DISP. (30/CA)

## (undated) DEVICE — SYRINGE 10 ML CONTROL LL (25EA/BX 4BX/CA)

## (undated) DEVICE — GLOVE BIOGEL SZ 7.5 SURGICAL PF LTX - (50PR/BX 4BX/CA)

## (undated) DEVICE — KIT ROOM DECONTAMINATION

## (undated) DEVICE — ELECTRODE DUAL RETURN W/ CORD - (50/PK)

## (undated) DEVICE — SET EXTENSION WITH 2 PORTS (48EA/CA) ***PART #2C8610 IS A SUBSTITUTE*****

## (undated) DEVICE — DRESSING NON ADHERENT 3 X 4 - STERILE (100/BX 12BX/CA)

## (undated) DEVICE — GLOVE BIOGEL PI INDICATOR SZ 8.0 SURGICAL PF LF -(50/BX 4BX/CA)

## (undated) DEVICE — SODIUM CHL IRRIGATION 0.9% 1000ML (12EA/CA)

## (undated) DEVICE — PACK MINOR BASIN - (2EA/CA)

## (undated) DEVICE — GLOVE BIOGEL PI INDICATOR SZ 7.5 SURGICAL PF LF -(50/BX 4BX/CA)

## (undated) DEVICE — ELECTRODE 850 FOAM ADHESIVE - HYDROGEL RADIOTRNSPRNT (50/PK)

## (undated) DEVICE — HEAD HOLDER JUNIOR/ADULT

## (undated) DEVICE — Device

## (undated) DEVICE — MASK ANESTHESIA ADULT  - (100/CA)

## (undated) DEVICE — BLADE SURGICAL #10 - (50/BX)

## (undated) DEVICE — BLADE SURGICAL #15 - (50/BX 3BX/CA)

## (undated) DEVICE — NEPTUNE 4 PORT MANIFOLD - (20/PK)

## (undated) DEVICE — SPONGE GAUZESTER 4 X 4 4PLY - (128PK/CA)

## (undated) DEVICE — RESERVOIR SUCTION 100 CC - SILICONE (20EA/CA)

## (undated) DEVICE — SUTURE 2-0 VICRYL PLUS CT-1 36 (36PK/BX)"

## (undated) DEVICE — ARMREST CRADLE FOAM - (2PR/PK 12PR/CA)

## (undated) DEVICE — NEEDLE SPINAL NON-SAFETY 25GA X 3 (25EA/BX)"

## (undated) DEVICE — KIT ANESTHESIA W/CIRCUIT & 3/LT BAG W/FILTER (20EA/CA)

## (undated) DEVICE — NEEDLE NON SAFETY HYPO 22 GA X 1 1/2 IN (100/BX)

## (undated) DEVICE — DRAPE STRLE REG TOWEL 18X24 - (10/BX 4BX/CA)"

## (undated) DEVICE — CANISTER SUCTION 3000ML MECHANICAL FILTER AUTO SHUTOFF MEDI-VAC NONSTERILE LF DISP  (40EA/CA)

## (undated) DEVICE — PROTECTOR ULNA NERVE - (36PR/CA)

## (undated) DEVICE — STAPLER SKIN DISP - (6/BX 10BX/CA) VISISTAT

## (undated) DEVICE — CONTAINER, SPECIMEN, STERILE

## (undated) DEVICE — SYRINGE LOSS OF RESIST. 50/BX - (50/CA)

## (undated) DEVICE — SET LEADWIRE 5 LEAD BEDSIDE DISPOSABLE ECG (1SET OF 5/EA)

## (undated) DEVICE — SUTURE GENERAL

## (undated) DEVICE — DRAPE C-ARM LARGE 41IN X 74 IN - (10/BX 2BX/CA)

## (undated) DEVICE — TUBING CLEARLINK DUO-VENT - C-FLO (48EA/CA)

## (undated) DEVICE — SUTURE 0 ETHIBOND CT-1 - (12/BX) 18 INCH

## (undated) DEVICE — DRAPE IOBAN II INCISE 23X17 - (10EA/BX 4BX/CA)

## (undated) DEVICE — DRESSING TRANSPARENT FILM TEGADERM 4 X 4.75" (50EA/BX)"

## (undated) DEVICE — KIT SURGIFLO W/OUT THROMBIN - (6EA/CA)

## (undated) DEVICE — SLEEVE, VASO, THIGH, MED

## (undated) DEVICE — SENSOR SPO2 NEO LNCS ADHESIVE (20/BX) SEE USER NOTES

## (undated) DEVICE — LACTATED RINGERS INJ 1000 ML - (14EA/CA 60CA/PF)

## (undated) DEVICE — TRAY SKIN SCRUB PVP WET (20EA/CA) PART #DYND70356 DISCONTINUED

## (undated) DEVICE — DRAIN J-P FLAT 7MM X 20CM - (10/CA)

## (undated) DEVICE — GLOVE BIOGEL SZ 7 SURGICAL PF LTX - (50PR/BX 4BX/CA)

## (undated) DEVICE — DRESSING NON-ADHERING 8 X 3 - (50/BX)

## (undated) DEVICE — GOWN WARMING STANDARD FLEX - (30/CA)

## (undated) DEVICE — SUCTION INSTRUMENT YANKAUER BULBOUS TIP W/O VENT (50EA/CA)

## (undated) DEVICE — HANDPIECE 10FT INTPLS SCT PLS IRRIGATION HAND CONTROL SET (6/PK)